# Patient Record
Sex: MALE | Race: WHITE | NOT HISPANIC OR LATINO | Employment: FULL TIME | URBAN - METROPOLITAN AREA
[De-identification: names, ages, dates, MRNs, and addresses within clinical notes are randomized per-mention and may not be internally consistent; named-entity substitution may affect disease eponyms.]

---

## 2022-11-02 ENCOUNTER — OFFICE VISIT (OUTPATIENT)
Dept: FAMILY MEDICINE CLINIC | Facility: CLINIC | Age: 22
End: 2022-11-02

## 2022-11-02 VITALS
HEART RATE: 90 BPM | BODY MASS INDEX: 42.66 KG/M2 | DIASTOLIC BLOOD PRESSURE: 84 MMHG | HEIGHT: 72 IN | SYSTOLIC BLOOD PRESSURE: 134 MMHG | OXYGEN SATURATION: 98 % | RESPIRATION RATE: 20 BRPM | WEIGHT: 315 LBS

## 2022-11-02 DIAGNOSIS — Z76.89 ENCOUNTER TO ESTABLISH CARE: Primary | ICD-10-CM

## 2022-11-02 DIAGNOSIS — E66.01 MORBID (SEVERE) OBESITY DUE TO EXCESS CALORIES: ICD-10-CM

## 2022-11-02 DIAGNOSIS — Z83.3 FAMILY HISTORY OF DIABETES MELLITUS IN GRANDMOTHER: ICD-10-CM

## 2022-11-02 DIAGNOSIS — Z13.220 LIPID SCREENING: ICD-10-CM

## 2022-11-02 PROCEDURE — 99204 OFFICE O/P NEW MOD 45 MIN: CPT | Performed by: NURSE PRACTITIONER

## 2022-11-02 RX ORDER — CARBOXYMETHYLCELLULOSE/CITRIC 0.75 G
3 CAPSULE ORAL 2 TIMES DAILY
Qty: 180 CAPSULE | Refills: 5 | Status: SHIPPED | OUTPATIENT
Start: 2022-11-02

## 2022-11-02 NOTE — PROGRESS NOTES
"Chief Complaint  Establish Care    Subjective          Juvenal Herrera presents to [facility]  History of Present Illness    Is a new patient today, is here today to establish care and discuss obesity    He is working as a    He is not in school at this time, was interested in culinary school  Does intend to return to school, does not intend to return for culinary    Review of Systems   Constitutional: Negative.  Negative for appetite change, fatigue and fever.   Respiratory: Negative.  Negative for shortness of breath.    Cardiovascular: Negative.  Negative for chest pain and palpitations.   Genitourinary: Negative.  Negative for frequency.   Musculoskeletal: Negative.    Psychiatric/Behavioral: Negative for dysphoric mood and sleep disturbance. The patient is not nervous/anxious.      Objective   Vital Signs:  Pulse 90   Resp 20   Ht 182.9 cm (72\")   Wt (!) 189 kg (416 lb)   SpO2 98%   BMI 56.42 kg/m²     BP Readings from Last 3 Encounters:   No data found for BP        Wt Readings from Last 3 Encounters:   11/02/22 (!) 189 kg (416 lb)        Class 3 Severe Obesity (BMI >=40). Obesity-related health conditions include the following: none. Obesity is unchanged. BMI is is above average; BMI management plan is completed. We discussed low calorie, low carb based diet program, portion control and increasing exercise.      Physical Exam  Constitutional:       Appearance: Normal appearance. He is obese.   Neck:      Vascular: No carotid bruit.   Cardiovascular:      Rate and Rhythm: Normal rate and regular rhythm.      Pulses: Normal pulses.      Heart sounds: Normal heart sounds.   Pulmonary:      Effort: Pulmonary effort is normal.      Breath sounds: Normal breath sounds.   Musculoskeletal:      Cervical back: Neck supple.      Right lower leg: No edema.      Left lower leg: No edema.   Skin:     General: Skin is warm.   Neurological:      Mental Status: He is " alert and oriented to person, place, and time.   Psychiatric:         Mood and Affect: Mood normal.        Result Review :                 Assessment and Plan    Diagnoses and all orders for this visit:    1. Encounter to establish care (Primary)    2. Morbid (severe) obesity due to excess calories (HCC)  -     Comprehensive metabolic panel; Future  -     Lipid panel; Future  -     Carboxymeth-Cellulose-CitricAc (Plenity Welcome Kit) capsule; Take 3 capsules by mouth 2 (Two) Times a Day. With 16 ounces of water  Dispense: 180 capsule; Refill: 5    3. Family history of diabetes mellitus in grandmother  -     Hemoglobin A1c; Future    4. Lipid screening  -     Lipid panel; Future           Follow Up   Return in about 6 months (around 5/2/2023).  Patient was given instructions and counseling regarding his condition or for health maintenance advice. Please see specific information pulled into the AVS if appropriate.

## 2023-05-03 ENCOUNTER — OFFICE VISIT (OUTPATIENT)
Dept: FAMILY MEDICINE CLINIC | Facility: CLINIC | Age: 23
End: 2023-05-03
Payer: COMMERCIAL

## 2023-05-03 ENCOUNTER — LAB (OUTPATIENT)
Dept: FAMILY MEDICINE CLINIC | Facility: CLINIC | Age: 23
End: 2023-05-03
Payer: COMMERCIAL

## 2023-05-03 VITALS
HEIGHT: 72 IN | BODY MASS INDEX: 42.66 KG/M2 | SYSTOLIC BLOOD PRESSURE: 134 MMHG | HEART RATE: 97 BPM | WEIGHT: 315 LBS | DIASTOLIC BLOOD PRESSURE: 90 MMHG | OXYGEN SATURATION: 97 % | RESPIRATION RATE: 18 BRPM

## 2023-05-03 DIAGNOSIS — E66.01 MORBID (SEVERE) OBESITY DUE TO EXCESS CALORIES: Primary | ICD-10-CM

## 2023-05-03 DIAGNOSIS — E66.01 MORBID (SEVERE) OBESITY DUE TO EXCESS CALORIES: ICD-10-CM

## 2023-05-03 DIAGNOSIS — Z83.3 FAMILY HISTORY OF DIABETES MELLITUS IN GRANDMOTHER: ICD-10-CM

## 2023-05-03 DIAGNOSIS — R03.0 ELEVATED BP WITHOUT DIAGNOSIS OF HYPERTENSION: ICD-10-CM

## 2023-05-03 DIAGNOSIS — Z13.220 LIPID SCREENING: ICD-10-CM

## 2023-05-03 LAB
ALBUMIN SERPL-MCNC: 4.1 G/DL (ref 3.5–5.2)
ALBUMIN/GLOB SERPL: 1.2 G/DL
ALP SERPL-CCNC: 124 U/L (ref 39–117)
ALT SERPL W P-5'-P-CCNC: 32 U/L (ref 1–41)
ANION GAP SERPL CALCULATED.3IONS-SCNC: 11 MMOL/L (ref 5–15)
AST SERPL-CCNC: 22 U/L (ref 1–40)
BILIRUB SERPL-MCNC: 0.4 MG/DL (ref 0–1.2)
BUN SERPL-MCNC: 16 MG/DL (ref 6–20)
BUN/CREAT SERPL: 25.8 (ref 7–25)
CALCIUM SPEC-SCNC: 9.3 MG/DL (ref 8.6–10.5)
CHLORIDE SERPL-SCNC: 101 MMOL/L (ref 98–107)
CHOLEST SERPL-MCNC: 223 MG/DL (ref 0–200)
CO2 SERPL-SCNC: 24 MMOL/L (ref 22–29)
CREAT SERPL-MCNC: 0.62 MG/DL (ref 0.76–1.27)
EGFRCR SERPLBLD CKD-EPI 2021: 137.7 ML/MIN/1.73
GLOBULIN UR ELPH-MCNC: 3.5 GM/DL
GLUCOSE SERPL-MCNC: 100 MG/DL (ref 65–99)
HBA1C MFR BLD: 5.5 % (ref 4.8–5.6)
HDLC SERPL-MCNC: 34 MG/DL (ref 40–60)
LDLC SERPL CALC-MCNC: 154 MG/DL (ref 0–100)
LDLC/HDLC SERPL: 4.44 {RATIO}
POTASSIUM SERPL-SCNC: 4.5 MMOL/L (ref 3.5–5.2)
PROT SERPL-MCNC: 7.6 G/DL (ref 6–8.5)
SODIUM SERPL-SCNC: 136 MMOL/L (ref 136–145)
TRIGL SERPL-MCNC: 190 MG/DL (ref 0–150)
VLDLC SERPL-MCNC: 35 MG/DL (ref 5–40)

## 2023-05-03 PROCEDURE — 80053 COMPREHEN METABOLIC PANEL: CPT | Performed by: NURSE PRACTITIONER

## 2023-05-03 PROCEDURE — 80061 LIPID PANEL: CPT | Performed by: NURSE PRACTITIONER

## 2023-05-03 PROCEDURE — 36415 COLL VENOUS BLD VENIPUNCTURE: CPT

## 2023-05-03 PROCEDURE — 83036 HEMOGLOBIN GLYCOSYLATED A1C: CPT | Performed by: NURSE PRACTITIONER

## 2023-05-03 NOTE — PROGRESS NOTES
Chief Complaint  Follow-up    Subjective          Juvenal Herrera presents to [facility]  History of Present Illness    Is here today for follow up    Was last seen 11/2022 to establish care and for weight loss, he was prescribed plenity at that time and encouraged to work on lifestyle changes    He did not get requested labs done, has hgb A1c, CMP, and lipid labs pending    He did try the plenity for 3 months and does not feel like he got enough results    He is calorie counting - he is trying to cap at 1500 juan/day through the week and 2000 juan/day on the weekend  Has resumed going to the gym and has a floridalma he is working out with    He is fasting for labs    He declines referral to see a nutritionist at this time  He tells me that his mother is trying to get him to take omeprazole because she thinks it can help him lose weight    He has protein shake for breakfast  Left overs for lunch  Dinner with family     He endorses that his home cooked meals are fairly healthy  He is using an wayne to help with the calorie counting    He has not tried any weight watchers or other weight loss program    Diastolic BP is elevated today, initially 143/90  Repeat BP is 134/90  Has had his Bp checked outside of here and has reading of 120/80    Review of Systems   Constitutional: Negative.  Negative for appetite change and fatigue.   Respiratory: Negative.  Negative for shortness of breath.    Cardiovascular: Negative for chest pain.   Gastrointestinal: Negative.  Negative for abdominal pain and constipation.   Musculoskeletal: Negative.  Negative for arthralgias and myalgias.   Neurological: Negative.  Negative for dizziness and headaches.   Psychiatric/Behavioral: Negative for sleep disturbance.        Endorses that he sleeps well, averages 6-8 hours/night  Denies any obvious snoring, wakes feeling rested, denies am headaches     Objective   Vital Signs:  /90 (BP Location: Left arm,  "Patient Position: Sitting)   Pulse 97   Resp 18   Ht 182.9 cm (72.01\")   Wt (!) 187 kg (413 lb)   SpO2 97%   BMI 56.00 kg/m²     BP Readings from Last 3 Encounters:   05/03/23 134/90   11/23/22 152/90   11/02/22 134/84        Wt Readings from Last 3 Encounters:   05/03/23 (!) 187 kg (413 lb)   11/23/22 (!) 190 kg (419 lb)   11/02/22 (!) 189 kg (416 lb)        Class 3 Severe Obesity (BMI >=40). Obesity-related health conditions include the following: none. Obesity is improving with treatment. BMI is is above average; BMI management plan is completed. We discussed low calorie, low carb based diet program, portion control and increasing exercise.      Physical Exam  Vitals reviewed.   Constitutional:       Appearance: Normal appearance. He is obese.   Neck:      Vascular: No carotid bruit.   Cardiovascular:      Rate and Rhythm: Normal rate and regular rhythm.      Pulses: Normal pulses.      Heart sounds: Normal heart sounds.   Pulmonary:      Effort: Pulmonary effort is normal.      Breath sounds: Normal breath sounds.   Musculoskeletal:      Cervical back: Neck supple.      Right lower leg: No edema.      Left lower leg: No edema.   Skin:     General: Skin is warm.   Neurological:      Mental Status: He is alert and oriented to person, place, and time.        Result Review :                     Assessment and Plan    Diagnoses and all orders for this visit:    1. Morbid (severe) obesity due to excess calories (Primary)    2. Elevated BP without diagnosis of hypertension    check labs, monitor BP, close follow up       Follow Up   Return in about 4 weeks (around 5/31/2023) for Recheck weight, BP.  Patient was given instructions and counseling regarding his condition or for health maintenance advice. Please see specific information pulled into the AVS if appropriate.       " Yes...

## 2023-05-31 ENCOUNTER — OFFICE VISIT (OUTPATIENT)
Dept: FAMILY MEDICINE CLINIC | Facility: CLINIC | Age: 23
End: 2023-05-31

## 2023-05-31 VITALS
DIASTOLIC BLOOD PRESSURE: 88 MMHG | HEART RATE: 91 BPM | WEIGHT: 315 LBS | HEIGHT: 72 IN | SYSTOLIC BLOOD PRESSURE: 130 MMHG | OXYGEN SATURATION: 97 % | BODY MASS INDEX: 42.66 KG/M2 | RESPIRATION RATE: 18 BRPM

## 2023-05-31 DIAGNOSIS — R03.0 ELEVATED BP WITHOUT DIAGNOSIS OF HYPERTENSION: Primary | ICD-10-CM

## 2023-05-31 PROCEDURE — 99212 OFFICE O/P EST SF 10 MIN: CPT | Performed by: NURSE PRACTITIONER

## 2023-05-31 NOTE — PROGRESS NOTES
"Chief Complaint  Follow-up    Subjective          Juvenal Herrera presents to [facility]  History of Present Illness      Is here today for follow up on his elevated BP without h/o HTN  He has been monitoring his BP at home  Tells me that it has been running 120-130s/80-90s with t he 90s being on the lower end  It is more often in the 80s than into the 90s  He is exercising at least 3 days/week    He is trying to stick to a 3987-5400 juan/day through the week, with more laxity on the weekend    He is also watching salt intake, is drinking flavored in place of soda    Review of Systems   Constitutional: Negative.  Negative for appetite change, fatigue and fever.   Respiratory: Negative.  Negative for shortness of breath.    Cardiovascular: Negative.  Negative for chest pain.   Neurological: Negative.  Negative for dizziness and headaches.   Psychiatric/Behavioral: Negative for dysphoric mood and sleep disturbance. The patient is not nervous/anxious.      Objective   Vital Signs:  /88 (BP Location: Left arm, Patient Position: Sitting)   Pulse 91   Resp 18   Ht 182.9 cm (72.01\")   Wt (!) 189 kg (417 lb)   SpO2 97%   BMI 56.54 kg/m²     BP Readings from Last 3 Encounters:   05/31/23 130/88   05/03/23 134/90   11/23/22 152/90        Wt Readings from Last 3 Encounters:   05/31/23 (!) 189 kg (417 lb)   05/03/23 (!) 187 kg (413 lb)   11/23/22 (!) 190 kg (419 lb)        Class 3 Severe Obesity (BMI >=40). Obesity-related health conditions include the following: dyslipidemias. Obesity is unchanged. BMI is is above average; BMI management plan is completed. We discussed low calorie, low carb based diet program, portion control and increasing exercise.      Physical Exam  Vitals reviewed.   Constitutional:       Appearance: Normal appearance. He is obese.   Neck:      Vascular: No carotid bruit.   Cardiovascular:      Rate and Rhythm: Normal rate and regular rhythm.      Heart " sounds: Normal heart sounds.   Pulmonary:      Effort: Pulmonary effort is normal.      Breath sounds: Normal breath sounds.   Musculoskeletal:      Right lower leg: No edema.      Left lower leg: No edema.   Skin:     General: Skin is warm.   Neurological:      Mental Status: He is alert and oriented to person, place, and time.        Result Review :                 Assessment and Plan    Diagnoses and all orders for this visit:    1. Elevated BP without diagnosis of hypertension (Primary)    continue to monitor BP at home, continue regular exercise and diet changes, close follow up for weight and BP        Follow Up   Return in about 3 months (around 8/31/2023) for Recheck BP, weight.  Patient was given instructions and counseling regarding his condition or for health maintenance advice. Please see specific information pulled into the AVS if appropriate.

## 2023-10-05 ENCOUNTER — OFFICE VISIT (OUTPATIENT)
Dept: FAMILY MEDICINE CLINIC | Facility: CLINIC | Age: 23
End: 2023-10-05
Payer: COMMERCIAL

## 2023-10-05 VITALS
SYSTOLIC BLOOD PRESSURE: 132 MMHG | RESPIRATION RATE: 20 BRPM | DIASTOLIC BLOOD PRESSURE: 84 MMHG | OXYGEN SATURATION: 98 % | BODY MASS INDEX: 42.66 KG/M2 | WEIGHT: 315 LBS | HEIGHT: 72 IN | HEART RATE: 89 BPM

## 2023-10-05 DIAGNOSIS — R03.0 ELEVATED BP WITHOUT DIAGNOSIS OF HYPERTENSION: Primary | ICD-10-CM

## 2023-10-05 DIAGNOSIS — E66.01 MORBID (SEVERE) OBESITY DUE TO EXCESS CALORIES: ICD-10-CM

## 2023-10-05 PROBLEM — H53.021 REFRACTIVE AMBLYOPIA OF RIGHT EYE: Status: ACTIVE | Noted: 2021-12-03

## 2023-10-05 PROBLEM — H52.13 MYOPIA OF BOTH EYES: Status: ACTIVE | Noted: 2019-10-28

## 2023-10-05 NOTE — PROGRESS NOTES
Chief Complaint  Annual Exam    Subjective          Juvenal Herrera presents to [facility]  History of Present Illness    Is here today for follow up elevated blood pressure, weight, physical exam    Has h/o obesity, working on weight loss  Was seen in May for concerns about his blood pressure being elevated     At last visit we discussed a 4180-3649 juan/day diet which he already trying to do  He had changed his soda over to flavored water and was watching his salt intake  Encouraged increased physical activity    He tells me today that he has started weight watchers in August    He has not been doing much by way of physical activity, encouraged to work on increasing his physical activity    He has a goal of weighing 250 pounds  He thinks that the last time he weighed this was in high school    He has considered weight loss surgery but this is not something that is affordable to him at this time    We discussed his cholesterol lab and encouraged a low fat, high fiber diet, fish, lean proteins, whole grains, etc. And increasing the physical activity    He has been monitoring his blood pressure at home and he tells me that he has been getting good numbers, not elevated    Review of Systems   Constitutional:  Negative for appetite change, fatigue and fever.   Respiratory: Negative.  Negative for shortness of breath.    Cardiovascular: Negative.  Negative for chest pain.   Gastrointestinal: Negative.  Negative for abdominal pain, constipation and diarrhea.   Genitourinary: Negative.  Negative for frequency and urgency.   Musculoskeletal: Negative.  Negative for arthralgias and myalgias.   Allergic/Immunologic: Positive for environmental allergies.        He endorses occasional seasonal allergy symptoms   Neurological:  Negative for dizziness, weakness and headaches.   Psychiatric/Behavioral: Negative.  Negative for dysphoric mood and sleep disturbance. The patient is not  "nervous/anxious.        Objective   Vital Signs:  /84 (BP Location: Left arm, Patient Position: Sitting, Cuff Size: Large Adult)   Pulse 89   Resp 20   Ht 182.9 cm (72.01\")   Wt (!) 189 kg (416 lb 12.8 oz)   SpO2 98%   BMI 56.51 kg/m²     BP Readings from Last 3 Encounters:   10/05/23 132/84   05/31/23 130/88   05/03/23 134/90        Wt Readings from Last 3 Encounters:   10/05/23 (!) 189 kg (416 lb 12.8 oz)   05/31/23 (!) 189 kg (417 lb)   05/03/23 (!) 187 kg (413 lb)        Physical Exam  Vitals reviewed.   Constitutional:       Appearance: Normal appearance. He is obese.   HENT:      Right Ear: Tympanic membrane, ear canal and external ear normal.      Left Ear: Tympanic membrane, ear canal and external ear normal.      Nose: Nose normal.      Mouth/Throat:      Mouth: Mucous membranes are moist.      Pharynx: Oropharynx is clear.   Eyes:      Extraocular Movements: Extraocular movements intact.   Neck:      Thyroid: No thyromegaly.      Vascular: No carotid bruit.   Cardiovascular:      Rate and Rhythm: Normal rate and regular rhythm.      Pulses: Normal pulses.      Heart sounds: Normal heart sounds.   Pulmonary:      Effort: Pulmonary effort is normal.      Breath sounds: Normal breath sounds.   Abdominal:      General: Bowel sounds are normal.      Palpations: Abdomen is soft.      Tenderness: There is no abdominal tenderness. There is no right CVA tenderness or left CVA tenderness.   Musculoskeletal:         General: Normal range of motion.      Cervical back: Neck supple. No tenderness.      Right lower leg: No edema.      Left lower leg: No edema.   Lymphadenopathy:      Cervical: No cervical adenopathy.   Skin:     General: Skin is warm.   Neurological:      Mental Status: He is alert and oriented to person, place, and time.   Psychiatric:         Mood and Affect: Mood normal.      Result Review :     CMP          5/3/2023    10:00   CMP   Glucose 100    BUN 16    Creatinine 0.62    EGFR 137.7  "   Sodium 136    Potassium 4.5    Chloride 101    Calcium 9.3    Total Protein 7.6    Albumin 4.1    Globulin 3.5    Total Bilirubin 0.4    Alkaline Phosphatase 124    AST (SGOT) 22    ALT (SGPT) 32    Albumin/Globulin Ratio 1.2    BUN/Creatinine Ratio 25.8    Anion Gap 11.0      Lipid Panel          5/3/2023    10:00   Lipid Panel   Total Cholesterol 223    Triglycerides 190    HDL Cholesterol 34    VLDL Cholesterol 35    LDL Cholesterol  154    LDL/HDL Ratio 4.44      Most Recent A1C          5/3/2023    10:00   HGBA1C Most Recent   Hemoglobin A1C 5.50                Assessment and Plan    Diagnoses and all orders for this visit:    1. Elevated BP without diagnosis of hypertension (Primary)    2. Morbid (severe) obesity due to excess calories    Continue with weight loss efforts, increase physical activity, keep cholesterol in mind when making diet choices  Will plan to recheck his cholesterol at next visit       Follow Up   Return in about 3 months (around 1/5/2024), or as needed, for Recheck weight.  Patient was given instructions and counseling regarding his condition or for health maintenance advice. Please see specific information pulled into the AVS if appropriate.

## 2024-01-05 ENCOUNTER — OFFICE VISIT (OUTPATIENT)
Dept: FAMILY MEDICINE CLINIC | Facility: CLINIC | Age: 24
End: 2024-01-05
Payer: COMMERCIAL

## 2024-01-05 VITALS
BODY MASS INDEX: 42.66 KG/M2 | HEIGHT: 72 IN | SYSTOLIC BLOOD PRESSURE: 130 MMHG | HEART RATE: 88 BPM | DIASTOLIC BLOOD PRESSURE: 88 MMHG | WEIGHT: 315 LBS | OXYGEN SATURATION: 99 % | RESPIRATION RATE: 20 BRPM

## 2024-01-05 DIAGNOSIS — E66.01 MORBID (SEVERE) OBESITY DUE TO EXCESS CALORIES: Primary | ICD-10-CM

## 2024-01-05 NOTE — PROGRESS NOTES
"Chief Complaint  Follow-up (Weight and bp)    Subjective          Juvenal Herrera presents to [facility]  History of Present Illness    Is here today for follow up weight and blood pressure    He is active at work, does move and stack wood at home, he is not exercising regularly otherwise    He endorses that his meals tend to be healthy, mom has h/o obesity and has had bariatric surgery    He is interested in seeking information for himself about a possible bariatric surgery    Review of Systems   Constitutional: Negative.  Negative for appetite change, fatigue and fever.   Respiratory: Negative.  Negative for shortness of breath.    Cardiovascular: Negative.  Negative for chest pain.   Neurological: Negative.  Negative for dizziness, weakness and headaches.   Psychiatric/Behavioral: Negative.  Negative for dysphoric mood and sleep disturbance. The patient is not nervous/anxious.      Objective   Vital Signs:  /88   Pulse 88   Resp 20   Ht 182.9 cm (72.01\")   Wt (!) 190 kg (419 lb)   SpO2 99%   BMI 56.81 kg/m²     BP Readings from Last 3 Encounters:   01/05/24 130/88   10/05/23 132/84   05/31/23 130/88        Wt Readings from Last 3 Encounters:   01/05/24 (!) 190 kg (419 lb)   10/05/23 (!) 189 kg (416 lb 12.8 oz)   05/31/23 (!) 189 kg (417 lb)      Physical Exam  Vitals reviewed.   Constitutional:       Appearance: Normal appearance. He is obese.   Neck:      Vascular: No carotid bruit.   Cardiovascular:      Rate and Rhythm: Normal rate and regular rhythm.      Heart sounds: Normal heart sounds.   Pulmonary:      Effort: Pulmonary effort is normal.      Breath sounds: Normal breath sounds.   Musculoskeletal:      Cervical back: Neck supple.      Right lower leg: No edema.      Left lower leg: No edema.   Skin:     General: Skin is warm.   Neurological:      Mental Status: He is alert and oriented to person, place, and time.   Psychiatric:         Mood and Affect: Mood " normal.         Behavior: Behavior normal.        Result Review :     CMP          5/3/2023    10:00   CMP   Glucose 100    BUN 16    Creatinine 0.62    EGFR 137.7    Sodium 136    Potassium 4.5    Chloride 101    Calcium 9.3    Total Protein 7.6    Albumin 4.1    Globulin 3.5    Total Bilirubin 0.4    Alkaline Phosphatase 124    AST (SGOT) 22    ALT (SGPT) 32    Albumin/Globulin Ratio 1.2    BUN/Creatinine Ratio 25.8    Anion Gap 11.0      Lipid Panel          5/3/2023    10:00   Lipid Panel   Total Cholesterol 223    Triglycerides 190    HDL Cholesterol 34    VLDL Cholesterol 35    LDL Cholesterol  154    LDL/HDL Ratio 4.44      Most Recent A1C          5/3/2023    10:00   HGBA1C Most Recent   Hemoglobin A1C 5.50                Assessment and Plan    Diagnoses and all orders for this visit:    1. Morbid (severe) obesity due to excess calories (Primary)  -     Ambulatory Referral to Bariatric Surgery           Follow Up   Return in about 9 months (around 10/5/2024) for Annual physical.  Patient was given instructions and counseling regarding his condition or for health maintenance advice. Please see specific information pulled into the AVS if appropriate.

## 2024-11-27 ENCOUNTER — OFFICE VISIT (OUTPATIENT)
Dept: FAMILY MEDICINE CLINIC | Facility: CLINIC | Age: 24
End: 2024-11-27
Payer: COMMERCIAL

## 2024-11-27 VITALS
RESPIRATION RATE: 18 BRPM | SYSTOLIC BLOOD PRESSURE: 132 MMHG | BODY MASS INDEX: 42.66 KG/M2 | HEIGHT: 72 IN | OXYGEN SATURATION: 98 % | HEART RATE: 102 BPM | DIASTOLIC BLOOD PRESSURE: 88 MMHG | WEIGHT: 315 LBS

## 2024-11-27 DIAGNOSIS — E66.01 MORBID (SEVERE) OBESITY DUE TO EXCESS CALORIES: Primary | ICD-10-CM

## 2024-11-27 NOTE — PROGRESS NOTES
"Chief Complaint  Referral (Referral to Bariatrics)    Subjective          Juvenal Herrera presents to [facility]  History of Present Illness    Is here today to request a referral to bariatrics    He has had a referral allen the past but lost his insurance in the process and did not get to see them  He is now covered by health insurance again    His mother has h/o obesity and bariatric surgery that has been successful for her    He endorses that he has been off the wagon with diet and exercise but is working towards getting back in the routine    We discussed the importance of diet and lifestyle for maintenance of weight loss once achieved    He denies any new problems or concerns    Review of Systems   Constitutional: Negative.    Respiratory: Negative.     Cardiovascular: Negative.    Gastrointestinal: Negative.    Neurological: Negative.      Objective   Vital Signs:  /88 (BP Location: Right arm, Patient Position: Sitting)   Pulse 102   Resp 18   Ht 182.9 cm (72.01\")   Wt (!) 194 kg (427 lb)   SpO2 98%   BMI 57.90 kg/m²     BP Readings from Last 3 Encounters:   11/27/24 132/88   01/05/24 130/88   10/05/23 132/84        Wt Readings from Last 3 Encounters:   11/27/24 (!) 194 kg (427 lb)   01/05/24 (!) 190 kg (419 lb)   10/05/23 (!) 189 kg (416 lb 12.8 oz)       Physical Exam  Vitals reviewed.   Constitutional:       Appearance: Normal appearance. He is obese.   Neck:      Vascular: No carotid bruit.   Cardiovascular:      Rate and Rhythm: Normal rate and regular rhythm.      Pulses: Normal pulses.      Heart sounds: Normal heart sounds.   Pulmonary:      Effort: Pulmonary effort is normal.      Breath sounds: Normal breath sounds.   Musculoskeletal:      Right lower leg: No edema.      Left lower leg: No edema.   Skin:     General: Skin is warm.   Neurological:      Mental Status: He is alert and oriented to person, place, and time.   Psychiatric:         Mood and " Affect: Mood normal.         Behavior: Behavior normal.        Result Review :                 Assessment and Plan    Diagnoses and all orders for this visit:    1. Morbid (severe) obesity due to excess calories (Primary)  -     Ambulatory Referral to Bariatric Surgery           Follow Up   Return in about 3 months (around 2/27/2025), or as needed, for Annual physical.  Patient was given instructions and counseling regarding his condition or for health maintenance advice. Please see specific information pulled into the AVS if appropriate.

## 2025-02-17 ENCOUNTER — TELEPHONE (OUTPATIENT)
Dept: BARIATRICS/WEIGHT MGMT | Facility: CLINIC | Age: 25
End: 2025-02-17
Payer: COMMERCIAL

## 2025-02-17 NOTE — TELEPHONE ENCOUNTER
Called Juvenal Herrera    to discuss bariatric new patient packet   0 Months of SWL require by insurance       Current Health Plan(s)   Yes Cert of Coverage Obtained    VIA  pt    Primary/ ID #  Kareem   [id number]            Secondary/ ID #             Exclusion on Policy     Exclusion   No Pt wanting selfpay No     Deductible Individual                  Amount Met  3000.00                                       0 OOP Max Individual           Amount Met       Deductible Family                       Amount Met  6000.00                                       70.52 OOP Max Family               Amount Met  10,000.00                          70.52     Bariatric Lifetime Max 0      Facility or Accreditation required? None    Insurance Coverage %       Called Patient [phone]    Intake Scheduled for 3.4.25       Arrive at 745AM, bring drink, snack, and jacket, classroom is cold.  All pt will have class, see MÓNICA ANTONIO and [facility]      Cert of Coverage attached Yes     Does pt have a None  if yes Who   Need to be at appts? no    Told pt to [facility], area code 859 about 2 weeks prior to intake.   Paperwork needs to be completed before intake appt, or may cause delay in eval. Yes    Additional Notes:

## 2025-02-27 ENCOUNTER — LAB (OUTPATIENT)
Dept: FAMILY MEDICINE CLINIC | Facility: CLINIC | Age: 25
End: 2025-02-27
Payer: COMMERCIAL

## 2025-02-27 ENCOUNTER — OFFICE VISIT (OUTPATIENT)
Dept: FAMILY MEDICINE CLINIC | Facility: CLINIC | Age: 25
End: 2025-02-27
Payer: COMMERCIAL

## 2025-02-27 VITALS
OXYGEN SATURATION: 96 % | HEIGHT: 72 IN | SYSTOLIC BLOOD PRESSURE: 134 MMHG | BODY MASS INDEX: 42.66 KG/M2 | HEART RATE: 97 BPM | RESPIRATION RATE: 18 BRPM | DIASTOLIC BLOOD PRESSURE: 82 MMHG | WEIGHT: 315 LBS

## 2025-02-27 DIAGNOSIS — Z00.00 ANNUAL PHYSICAL EXAM: ICD-10-CM

## 2025-02-27 DIAGNOSIS — E55.9 VITAMIN D DEFICIENCY: ICD-10-CM

## 2025-02-27 DIAGNOSIS — Z00.00 ANNUAL PHYSICAL EXAM: Primary | ICD-10-CM

## 2025-02-27 DIAGNOSIS — D72.829 LEUKOCYTOSIS, UNSPECIFIED TYPE: ICD-10-CM

## 2025-02-27 LAB — TSH SERPL DL<=0.05 MIU/L-ACNC: 1.43 UIU/ML (ref 0.27–4.2)

## 2025-02-27 PROCEDURE — 80050 GENERAL HEALTH PANEL: CPT | Performed by: NURSE PRACTITIONER

## 2025-02-27 PROCEDURE — 82306 VITAMIN D 25 HYDROXY: CPT | Performed by: NURSE PRACTITIONER

## 2025-02-27 PROCEDURE — 83036 HEMOGLOBIN GLYCOSYLATED A1C: CPT | Performed by: NURSE PRACTITIONER

## 2025-02-27 PROCEDURE — 99395 PREV VISIT EST AGE 18-39: CPT | Performed by: NURSE PRACTITIONER

## 2025-02-27 PROCEDURE — 36415 COLL VENOUS BLD VENIPUNCTURE: CPT

## 2025-02-27 PROCEDURE — 80061 LIPID PANEL: CPT | Performed by: NURSE PRACTITIONER

## 2025-02-27 NOTE — PROGRESS NOTES
"Chief Complaint  Annual Exam    Subjective          Juvenal Herrera presents to [facility]  History of Present Illness    Is here today for annual physical    No significant health history or routine medications, is obese    Was referred to bariatrics in  November 2024, has an upcoming office visit next week    Diet is reported to be fair, understands what changes could be made    Is staying active with wood chopping and stacking    Is not fasting for lab, had nuts, diet soda, and a cold-cut sandwich for lunch    Review of Systems   Constitutional: Negative.  Negative for activity change, appetite change and fatigue.   Respiratory: Negative.  Negative for shortness of breath.    Cardiovascular: Negative.  Negative for chest pain and palpitations.   Gastrointestinal: Negative.    Genitourinary: Negative.    Musculoskeletal: Negative.    Allergic/Immunologic: Positive for environmental allergies.        Endorses mild seasonal allergies   Neurological: Negative.  Negative for dizziness, weakness and headaches.   Psychiatric/Behavioral: Negative.  Negative for dysphoric mood and sleep disturbance. The patient is not nervous/anxious.         Sleeps fair, is not getting enough sleep, averages 5-7 hours/night     Objective   Vital Signs:  /82 (BP Location: Right arm, Patient Position: Sitting, Cuff Size: Large Adult)   Pulse 97   Resp 18   Ht 182.9 cm (72.01\")   Wt (!) 195 kg (430 lb 3.2 oz)   SpO2 96%   BMI 58.33 kg/m²     BP Readings from Last 3 Encounters:   02/27/25 134/82   11/27/24 132/88   01/05/24 130/88        Wt Readings from Last 3 Encounters:   02/27/25 (!) 195 kg (430 lb 3.2 oz)   11/27/24 (!) 194 kg (427 lb)   01/05/24 (!) 190 kg (419 lb)      Physical Exam  Vitals reviewed.   Constitutional:       Appearance: Normal appearance. He is obese.   HENT:      Right Ear: Tympanic membrane, ear canal and external ear normal.      Left Ear: Tympanic membrane, ear canal " and external ear normal.      Mouth/Throat:      Mouth: Mucous membranes are moist.      Pharynx: Oropharynx is clear.   Neck:      Thyroid: No thyromegaly.      Vascular: No carotid bruit.   Cardiovascular:      Rate and Rhythm: Normal rate and regular rhythm.      Pulses: Normal pulses.      Heart sounds: Normal heart sounds.   Pulmonary:      Effort: Pulmonary effort is normal.      Breath sounds: Normal breath sounds.   Abdominal:      General: Bowel sounds are normal.      Palpations: Abdomen is soft.   Musculoskeletal:      Cervical back: Neck supple. No tenderness.      Right lower leg: No edema.      Left lower leg: No edema.   Lymphadenopathy:      Cervical: No cervical adenopathy.   Skin:     General: Skin is warm.   Neurological:      Mental Status: He is alert and oriented to person, place, and time.   Psychiatric:         Mood and Affect: Mood normal.         Behavior: Behavior normal.        Result Review :                 Assessment and Plan    Diagnoses and all orders for this visit:    1. Annual physical exam (Primary)  -     Comprehensive metabolic panel; Future  -     Hemoglobin A1c; Future  -     CBC w AUTO Differential; Future  -     Lipid panel; Future  -     TSH Rfx On Abnormal To Free T4; Future  -     Vitamin D 25 hydroxy; Future           Follow Up   Return in about 1 year (around 2/27/2026), or as needed, for Annual physical.  Patient was given instructions and counseling regarding his condition or for health maintenance advice. Please see specific information pulled into the AVS if appropriate.

## 2025-02-28 ENCOUNTER — TELEPHONE (OUTPATIENT)
Dept: FAMILY MEDICINE CLINIC | Facility: CLINIC | Age: 25
End: 2025-02-28
Payer: COMMERCIAL

## 2025-02-28 PROBLEM — E55.9 VITAMIN D DEFICIENCY: Status: ACTIVE | Noted: 2025-02-28

## 2025-02-28 LAB
25(OH)D3 SERPL-MCNC: 14.7 NG/ML (ref 30–100)
ALBUMIN SERPL-MCNC: 3.9 G/DL (ref 3.5–5.2)
ALBUMIN/GLOB SERPL: 1.1 G/DL
ALP SERPL-CCNC: 131 U/L (ref 39–117)
ALT SERPL W P-5'-P-CCNC: 46 U/L (ref 1–41)
ANION GAP SERPL CALCULATED.3IONS-SCNC: 10.1 MMOL/L (ref 5–15)
AST SERPL-CCNC: 27 U/L (ref 1–40)
BASOPHILS # BLD AUTO: 0.06 10*3/MM3 (ref 0–0.2)
BASOPHILS NFR BLD AUTO: 0.5 % (ref 0–1.5)
BILIRUB SERPL-MCNC: 0.3 MG/DL (ref 0–1.2)
BUN SERPL-MCNC: 16 MG/DL (ref 6–20)
BUN/CREAT SERPL: 20.8 (ref 7–25)
CALCIUM SPEC-SCNC: 9.6 MG/DL (ref 8.6–10.5)
CHLORIDE SERPL-SCNC: 103 MMOL/L (ref 98–107)
CHOLEST SERPL-MCNC: 207 MG/DL (ref 0–200)
CO2 SERPL-SCNC: 24.9 MMOL/L (ref 22–29)
CREAT SERPL-MCNC: 0.77 MG/DL (ref 0.76–1.27)
DEPRECATED RDW RBC AUTO: 42.5 FL (ref 37–54)
EGFRCR SERPLBLD CKD-EPI 2021: 128.2 ML/MIN/1.73
EOSINOPHIL # BLD AUTO: 0.08 10*3/MM3 (ref 0–0.4)
EOSINOPHIL NFR BLD AUTO: 0.7 % (ref 0.3–6.2)
ERYTHROCYTE [DISTWIDTH] IN BLOOD BY AUTOMATED COUNT: 13.5 % (ref 12.3–15.4)
GLOBULIN UR ELPH-MCNC: 3.7 GM/DL
GLUCOSE SERPL-MCNC: 100 MG/DL (ref 65–99)
HBA1C MFR BLD: 5.9 % (ref 4.8–5.6)
HCT VFR BLD AUTO: 43.6 % (ref 37.5–51)
HDLC SERPL-MCNC: 33 MG/DL (ref 40–60)
HGB BLD-MCNC: 14.6 G/DL (ref 13–17.7)
IMM GRANULOCYTES # BLD AUTO: 0.04 10*3/MM3 (ref 0–0.05)
IMM GRANULOCYTES NFR BLD AUTO: 0.3 % (ref 0–0.5)
LDLC SERPL CALC-MCNC: 144 MG/DL (ref 0–100)
LDLC/HDLC SERPL: 4.28 {RATIO}
LYMPHOCYTES # BLD AUTO: 2.64 10*3/MM3 (ref 0.7–3.1)
LYMPHOCYTES NFR BLD AUTO: 21.6 % (ref 19.6–45.3)
MCH RBC QN AUTO: 28.9 PG (ref 26.6–33)
MCHC RBC AUTO-ENTMCNC: 33.5 G/DL (ref 31.5–35.7)
MCV RBC AUTO: 86.2 FL (ref 79–97)
MONOCYTES # BLD AUTO: 0.9 10*3/MM3 (ref 0.1–0.9)
MONOCYTES NFR BLD AUTO: 7.4 % (ref 5–12)
NEUTROPHILS NFR BLD AUTO: 69.5 % (ref 42.7–76)
NEUTROPHILS NFR BLD AUTO: 8.5 10*3/MM3 (ref 1.7–7)
NRBC BLD AUTO-RTO: 0 /100 WBC (ref 0–0.2)
PLATELET # BLD AUTO: 369 10*3/MM3 (ref 140–450)
PMV BLD AUTO: 9.6 FL (ref 6–12)
POTASSIUM SERPL-SCNC: 4 MMOL/L (ref 3.5–5.2)
PROT SERPL-MCNC: 7.6 G/DL (ref 6–8.5)
RBC # BLD AUTO: 5.06 10*6/MM3 (ref 4.14–5.8)
SODIUM SERPL-SCNC: 138 MMOL/L (ref 136–145)
TRIGL SERPL-MCNC: 164 MG/DL (ref 0–150)
VLDLC SERPL-MCNC: 30 MG/DL (ref 5–40)
WBC NRBC COR # BLD AUTO: 12.22 10*3/MM3 (ref 3.4–10.8)

## 2025-02-28 RX ORDER — ERGOCALCIFEROL 1.25 MG/1
50000 CAPSULE, LIQUID FILLED ORAL WEEKLY
Qty: 12 CAPSULE | Refills: 0 | Status: SHIPPED | OUTPATIENT
Start: 2025-02-28

## 2025-02-28 NOTE — TELEPHONE ENCOUNTER
HUB to share.  to return call. Your labs are as follows:  Hemoglobin A1C is 5.9, up from 5.5 last year.  5.9 is in the prediabetes range, this will most likely improve with diet changes, exercise and weight loss.     On the metabolic panel, your blood sugar was a little elevated at 100, and liver enzymes are mildly elevated as well - again, working to improve diet, along with exercise and weight loss should be helpful here, we will keep an eye on these.  Your metabolic panel otherwise, electrolytes, proteins, and kidney functions are all normal.     The blood counts are overall fine, your white blood cell count is a little elevated which happens with illness - if you have had a recent illness or are coming down with something that would explain it, will recheck this as well, would not do any sooner than 2-4 weeks at your convenience.       Your thyroid lab is normal.     Your vitamin D is low at 14.7, normal is .  I have sent a prescription for a once weekly vitamin D supplement, you will take it for 12 weeks.  Once finished, I recommend that you continue with a daily over the counter dose of vitamin D3 50 mcg or 2000 IU with vitamin K2.  Getting your vitamin D level up is beneficial to your immune system, and will usually improve feelings of tiredness or fatigue.     Your lipids are improved from last check, certainly room for improvement, but not terrible especially since you were not fasting -  Total cholesterol is 207, goal is less than 200,  Triglycerides are 164, goal is less than 150,  HDL is 33, goal is greater than 40 (increase fiber in the diet and physical activity), and  LDL is 144, goal is less than 100.  As with the blood sugar, A1C, and liver enzymes, these values will likely get to goals with lifestyle modification as discussed.    ----- Message from Mayra ROD sent at 2/28/2025  8:08 AM EST -----      ----- Message -----  From: Jessi Rivera APRN  Sent: 2/28/2025   7:17 AM EST  To:  Mayra Krishna MA    Please call with result note if not seen in [facility], thank you.    Your labs are as follows:  Hemoglobin A1C is 5.9, up from 5.5 last year.  5.9 is in the prediabetes range, this will most likely improve with diet changes, exercise and weight loss.    On the metabolic panel, your blood sugar was a little elevated at 100, and liver enzymes are mildly elevated as well - again, working to improve diet, along with exercise and weight loss should be helpful here, we will keep an eye on these.  Your metabolic panel otherwise, electrolytes, proteins, and kidney functions are all normal.    The blood counts are overall fine, your white blood cell count is a little elevated which happens with illness - if you have had a recent illness or are coming down with something that would explain it, will recheck this as well, would not do any sooner than 2-4 weeks at your convenience.      Your thyroid lab is normal.    Your vitamin D is low at 14.7, normal is .  I have sent a prescription for a once weekly vitamin D supplement, you will take it for 12 weeks.  Once finished, I recommend that you continue with a daily over the counter dose of vitamin D3 50 mcg or 2000 IU with vitamin K2.  Getting your vitamin D level up is beneficial to your immune system, and will usually improve feelings of tiredness or fatigue.    Your lipids are improved from last check, certainly room for improvement, but not terrible especially since you were not fasting -   Total cholesterol is 207, goal is less than 200,  Triglycerides are 164, goal is less than 150,  HDL is 33, goal is greater than 40 (increase fiber in the diet and physical activity), and  LDL is 144, goal is less than 100.  As with the blood sugar, A1C, and liver enzymes, these values will likely get to goals with lifestyle modification as discussed.

## 2025-03-04 ENCOUNTER — PREP FOR SURGERY (OUTPATIENT)
Dept: OTHER | Facility: HOSPITAL | Age: 25
End: 2025-03-04
Payer: COMMERCIAL

## 2025-03-04 ENCOUNTER — CONSULT (OUTPATIENT)
Dept: BARIATRICS/WEIGHT MGMT | Facility: CLINIC | Age: 25
End: 2025-03-04
Payer: COMMERCIAL

## 2025-03-04 ENCOUNTER — PATIENT ROUNDING (BHMG ONLY) (OUTPATIENT)
Dept: BARIATRICS/WEIGHT MGMT | Facility: CLINIC | Age: 25
End: 2025-03-04
Payer: COMMERCIAL

## 2025-03-04 VITALS
BODY MASS INDEX: 44.1 KG/M2 | DIASTOLIC BLOOD PRESSURE: 88 MMHG | RESPIRATION RATE: 18 BRPM | SYSTOLIC BLOOD PRESSURE: 131 MMHG | HEIGHT: 71 IN | WEIGHT: 315 LBS | OXYGEN SATURATION: 99 % | HEART RATE: 88 BPM

## 2025-03-04 DIAGNOSIS — E66.813 CLASS 3 OBESITY: Primary | ICD-10-CM

## 2025-03-04 DIAGNOSIS — Z01.818 PRE-OP EXAM: ICD-10-CM

## 2025-03-04 RX ORDER — DIPHENOXYLATE HYDROCHLORIDE AND ATROPINE SULFATE 2.5; .025 MG/1; MG/1
TABLET ORAL
COMMUNITY

## 2025-03-04 RX ORDER — SODIUM CHLORIDE 9 MG/ML
30 INJECTION, SOLUTION INTRAVENOUS CONTINUOUS PRN
OUTPATIENT
Start: 2025-03-04 | End: 2025-03-04

## 2025-03-04 RX ORDER — SODIUM CHLORIDE 0.9 % (FLUSH) 0.9 %
10 SYRINGE (ML) INJECTION AS NEEDED
OUTPATIENT
Start: 2025-03-04

## 2025-03-04 NOTE — H&P (VIEW-ONLY)
"Juvenal Herrera is a 24 y.o. male with morbid obesity with co-morbidities including none    [facility] BARIATRIC SURGERY  [address] [place] [address]  [address] [place] [address]  Dept: [phone]  3/4/2025      Juvenal Herrera.  [id number]  [id number]  2000  male      Chief Complaint of weight gain; unable to maintain weight loss    History of Present Illness:   Juvenal is a 24 y.o. male who presents today for evaluation, education and consultation regarding weight loss surgery. The patient is interested in the sleeve gastrectomy.      Diet History:See dietician/RN/MA documentation for complete history of weight and diet.     Bariatric Surgery Evaluation: The patient is being seen for an initial visit for bariatric surgery evaluation.     ;STOP-Bang Score  Have you been diagnosed with Sleep Apnea?: no  Snoring?: no  Tired?: yes  Observed?: no  Pressure?: no  Stop Score: 1  Body Mass Index more than 35 kg/m2?: yes  Age older than 50 year old?: no  Neck large? \">17\"/43cm-M, >16\"/41cm-F: yes  Gender=Male?: yes  Total Stop-Bang Score: 4      Past weight loss attempts:   Fasting, calorie counting, weight watchers, plenity       Past Medical History:   Diagnosis Date    Allergic 2000    Pennicillin and omoxycillan    Obesity    Childhood asthma     History reviewed. No pertinent surgical history.  [place] teeth     Allergies   Allergen Reactions    Penicillins Hives    Amoxicillin Rash    Cephalexin Rash         Current Outpatient Medications:     Multi-Vitamin tablet tablet, , Disp: , Rfl:     vitamin D (ERGOCALCIFEROL) 1.25 MG (70600 UT) capsule capsule, Take 1 capsule by mouth 1 (One) Time Per Week., Disp: 12 capsule, Rfl: 0    Social History     Socioeconomic History    Marital status: Single   Tobacco Use    Smoking status: Never     Passive exposure: Never    Smokeless tobacco: Never   Vaping Use    Vaping status: Never Used "   Substance and Sexual Activity    Alcohol use: Not Currently    Drug use: Never    Sexual activity: Defer       Family History   Problem Relation Age of Onset    Other Mother     Hypertension Mother     Hyperlipidemia Father     Thyroid disease Father 52        oncocytic follicular thyroid carcinoma   Brain cancer - grandfather - paternal       Review of Systems:  Review of Systems   Constitutional:         Allergies    Respiratory: Negative.     Cardiovascular:         Shortness of breath on exertion , HLD- no meds   Gastrointestinal: Negative.    Endocrine: Negative.    Genitourinary: Negative.    Musculoskeletal:  Positive for back pain.   Skin: Negative.    Neurological: Negative.    Hematological: Negative.    Psychiatric/Behavioral: Negative.         Physical Exam:  Vital Signs:  Weight: (!) 192 kg (423 lb 9.6 oz)   Body mass index is 59.08 kg/m².  Temp: (not recorded)   Heart Rate: 88   BP: 131/88     Physical Exam  Constitutional:       Appearance: Normal appearance. He is obese.   Cardiovascular:      Rate and Rhythm: Normal rate.   Pulmonary:      Effort: Pulmonary effort is normal.      Breath sounds: Normal breath sounds.   Abdominal:      General: Abdomen is flat.      Palpations: Abdomen is soft.   Skin:     General: Skin is warm and dry.   Neurological:      General: No focal deficit present.      Mental Status: He is alert and oriented to person, place, and time.   Psychiatric:         Mood and Affect: Mood normal.         Behavior: Behavior normal.         Thought Content: Thought content normal.         Judgment: Judgment normal.            Assessment:         Juvenal Herrera is a 24 y.o. year old male with medically complicated severe obesity. Weight: (!) 192 kg (423 lb 9.6 oz), Body mass index is 59.08 kg/m². and weight related problems.    I explained in detail the procedures that we are performing.  All of those procedures can be performed laparoscopically but there is a chance to convert to  open if any technical challenges or complications do occur.  Bariatric surgery is not cosmetic surgery but rather a tool to help a patient make a life-long commitment lifestyle changes including diet, exercise, behavior changes, and taking supplemental vitamins and minerals.    Due to the patient's BMI and co-morbidities they are at a high risk for surgery and will obtain the following:   A psychological evaluation will be arranged for this patient. CBC, CMP,  TSH and HgbA1C will be drawn.     Juvenal Herrera will be set up for a pre-operative diagnostic esophagogastroduodenoscopy with biopsy for evaluation. The risks and benefits of the procedure were discussed with the patient in detail and all questions were answered.  Possibility of perforation, bleeding, aspiration, anoxic brain injury, respiratory and/or cardiac arrest and death were discussed.   He received handouts regarding, all questions were answered and informed consent was obtained.     The risks, benefits, alternatives, and potential complications of all of the procedures were explained in detail including, but not limited to death, anesthesia and medication adverse effect/DVT, pulmonary embolism, trocar site/incisional hernia, wound infection, abdominal infection, bleeding, failure to lose weight or gain weight and change in body image, metabolic complications with calcium, thiamine, vitamin B12, folate, iron, and anemia.    The patient was advised to start a high protein, low fat and low carbohydrate diet. The patient was given individualized information  along with general group information and handouts.     The patient was encouraged to start routine exercise including but not limited to 150 minutes per week.     The consultation plan was reviewed with the patient.    The patient understands the surgical procedures and the different surgical options that are available.  He understands the lifestyle changes that would be required after surgery and  has agreed to participate in a pre-operative and postoperative weight management program.  He also expressed understanding of possible risks, had several questions answered and desires to proceed.    I think he is a good candidate for this surgery, and is interested in a sleeve gastrectomy.    Encounter Diagnosis   Name Primary?    Class 3 obesity Yes       Plan:    Patient will have evaluations and follow up with bariatric dieticians and a psychologist before undergoing a multidisciplinary review of his candidacy.  We also discussed the weight loss requirement and rationale, and other program requirements.    PT will need an EGD prior to bariatric surgery. Plan to follow up after EGD with DR. Lassiter in the office.     Total time spent with pt 45 minutes of which 30 minutes were spent on education.     Mitzi Stevens, APRN  3/4/2025

## 2025-03-04 NOTE — PROGRESS NOTES
"Nutrition Services    Patient Name: Juvenal Herrera  YOB: 2000  MRN: [id number]  Date of Service: 03/04/25      ICD-10-CM ICD-9-CM   1. Class 3 obesity  E66.813 278.01   2. Pre-op exam  Z01.818 V72.84        RD Recommendation        Candidacy for surgery? Good   RD Comments Recommend patient for surgery    Procedure Patient is pursuing VSG     NUTRITION ASSESSMENT - BARIATRIC SURGERY      Reason for Visit RDN pamela for surgery, SWL 1/0     H&P      Past Medical History:   Diagnosis Date    Allergic 2000    Pennicillin and omoxycillan    Obesity        History reviewed. No pertinent surgical history.     Previous Goals          Encounter Information        Visit Narrative     Patient reports intake on workdays vs weekends is different. Patient reports he eats slowly now. Patient does currently eat and drink together. 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    Diet Recall:   Breakfast: valdez egg and cheese on english muffin, crackers   Lunch: leftovers form dinner (chicken and corn)  Dinner: steak with potatoes, pesto chicken pasta  Snacks: no snacks usually, applesauce sometimes   Beverages: energy drink, water, zero sodas    Exercise: patient does not currently go to the gym but tries to stay active at home/work    Supplements: MV, vit D    Self Monitoring:          Anthropometrics        Current Height, Weight Height: 180.3 cm (71\")  Weight: (!) 192 kg (423 lb 9.6 oz) (03/04/25 0822)       Trending Weight Hx      Wt Readings from Last 30 Encounters:   03/04/25 0822 (!) 192 kg (423 lb 9.6 oz)   02/27/25 1531 (!) 195 kg (430 lb 3.2 oz)   11/27/24 1515 (!) 194 kg (427 lb)   01/05/24 1602 (!) 190 kg (419 lb)   10/05/23 1600 (!) 189 kg (416 lb 12.8 oz)   05/31/23 0919 (!) 189 kg (417 lb)   05/03/23 0935 (!) 187 kg (413 lb)   11/23/22 1156 (!) 190 kg (419 lb)   11/02/22 1107 (!) 189 kg (416 lb)      BMI kg/m2 Body mass index is 59.08 kg/m².       Nutrition Diagnosis         Nutrition Dx " Statement Overweight/obesity RT multifactorial biochemical, behavioral and environmental contributors to disease AEB BMI 59.08 kg/m^2         Nutrition Intervention         Nutrition Intervention Nutrition education related to diet modification and physical activity        Monitor/Evaluation        New Goals Patient to eat and drink separately with 1 meal  Patient to eliminate sodas       Total time spent with pt 15 minutes of which 15 minutes were spent on education.       Electronically signed by:  Paresh Baugh RD  03/04/25 14:12 EST

## 2025-03-04 NOTE — PROGRESS NOTES
"Juvenal Herrera is a 24 y.o. male with morbid obesity with co-morbidities including none    [facility] BARIATRIC SURGERY  [address] [place] [address]  [address] [place] [address]  Dept: [phone]  3/4/2025      Juvenal Herrera.  [id number]  [id number]  2000  male      Chief Complaint of weight gain; unable to maintain weight loss    History of Present Illness:   Juvenal is a 24 y.o. male who presents today for evaluation, education and consultation regarding weight loss surgery. The patient is interested in the sleeve gastrectomy.      Diet History:See dietician/RN/MA documentation for complete history of weight and diet.     Bariatric Surgery Evaluation: The patient is being seen for an initial visit for bariatric surgery evaluation.     ;STOP-Bang Score  Have you been diagnosed with Sleep Apnea?: no  Snoring?: no  Tired?: yes  Observed?: no  Pressure?: no  Stop Score: 1  Body Mass Index more than 35 kg/m2?: yes  Age older than 50 year old?: no  Neck large? \">17\"/43cm-M, >16\"/41cm-F: yes  Gender=Male?: yes  Total Stop-Bang Score: 4      Past weight loss attempts:   Fasting, calorie counting, weight watchers, plenity       Past Medical History:   Diagnosis Date    Allergic 2000    Pennicillin and omoxycillan    Obesity    Childhood asthma     History reviewed. No pertinent surgical history.  [place] teeth     Allergies   Allergen Reactions    Penicillins Hives    Amoxicillin Rash    Cephalexin Rash         Current Outpatient Medications:     Multi-Vitamin tablet tablet, , Disp: , Rfl:     vitamin D (ERGOCALCIFEROL) 1.25 MG (50002 UT) capsule capsule, Take 1 capsule by mouth 1 (One) Time Per Week., Disp: 12 capsule, Rfl: 0    Social History     Socioeconomic History    Marital status: Single   Tobacco Use    Smoking status: Never     Passive exposure: Never    Smokeless tobacco: Never   Vaping Use    Vaping status: Never Used "   Substance and Sexual Activity    Alcohol use: Not Currently    Drug use: Never    Sexual activity: Defer       Family History   Problem Relation Age of Onset    Other Mother     Hypertension Mother     Hyperlipidemia Father     Thyroid disease Father 52        oncocytic follicular thyroid carcinoma   Brain cancer - grandfather - paternal       Review of Systems:  Review of Systems   Constitutional:         Allergies    Respiratory: Negative.     Cardiovascular:         Shortness of breath on exertion , HLD- no meds   Gastrointestinal: Negative.    Endocrine: Negative.    Genitourinary: Negative.    Musculoskeletal:  Positive for back pain.   Skin: Negative.    Neurological: Negative.    Hematological: Negative.    Psychiatric/Behavioral: Negative.         Physical Exam:  Vital Signs:  Weight: (!) 192 kg (423 lb 9.6 oz)   Body mass index is 59.08 kg/m².  Temp: (not recorded)   Heart Rate: 88   BP: 131/88     Physical Exam  Constitutional:       Appearance: Normal appearance. He is obese.   Cardiovascular:      Rate and Rhythm: Normal rate.   Pulmonary:      Effort: Pulmonary effort is normal.      Breath sounds: Normal breath sounds.   Abdominal:      General: Abdomen is flat.      Palpations: Abdomen is soft.   Skin:     General: Skin is warm and dry.   Neurological:      General: No focal deficit present.      Mental Status: He is alert and oriented to person, place, and time.   Psychiatric:         Mood and Affect: Mood normal.         Behavior: Behavior normal.         Thought Content: Thought content normal.         Judgment: Judgment normal.            Assessment:         Juvenal Herrera is a 24 y.o. year old male with medically complicated severe obesity. Weight: (!) 192 kg (423 lb 9.6 oz), Body mass index is 59.08 kg/m². and weight related problems.    I explained in detail the procedures that we are performing.  All of those procedures can be performed laparoscopically but there is a chance to convert to  open if any technical challenges or complications do occur.  Bariatric surgery is not cosmetic surgery but rather a tool to help a patient make a life-long commitment lifestyle changes including diet, exercise, behavior changes, and taking supplemental vitamins and minerals.    Due to the patient's BMI and co-morbidities they are at a high risk for surgery and will obtain the following:   A psychological evaluation will be arranged for this patient. CBC, CMP,  TSH and HgbA1C will be drawn.     Juvenal Herrera will be set up for a pre-operative diagnostic esophagogastroduodenoscopy with biopsy for evaluation. The risks and benefits of the procedure were discussed with the patient in detail and all questions were answered.  Possibility of perforation, bleeding, aspiration, anoxic brain injury, respiratory and/or cardiac arrest and death were discussed.   He received handouts regarding, all questions were answered and informed consent was obtained.     The risks, benefits, alternatives, and potential complications of all of the procedures were explained in detail including, but not limited to death, anesthesia and medication adverse effect/DVT, pulmonary embolism, trocar site/incisional hernia, wound infection, abdominal infection, bleeding, failure to lose weight or gain weight and change in body image, metabolic complications with calcium, thiamine, vitamin B12, folate, iron, and anemia.    The patient was advised to start a high protein, low fat and low carbohydrate diet. The patient was given individualized information  along with general group information and handouts.     The patient was encouraged to start routine exercise including but not limited to 150 minutes per week.     The consultation plan was reviewed with the patient.    The patient understands the surgical procedures and the different surgical options that are available.  He understands the lifestyle changes that would be required after surgery and  has agreed to participate in a pre-operative and postoperative weight management program.  He also expressed understanding of possible risks, had several questions answered and desires to proceed.    I think he is a good candidate for this surgery, and is interested in a sleeve gastrectomy.    Encounter Diagnosis   Name Primary?    Class 3 obesity Yes       Plan:    Patient will have evaluations and follow up with bariatric dieticians and a psychologist before undergoing a multidisciplinary review of his candidacy.  We also discussed the weight loss requirement and rationale, and other program requirements.    PT will need an EGD prior to bariatric surgery. Plan to follow up after EGD with DR. Lassiter in the office.     Total time spent with pt 45 minutes of which 30 minutes were spent on education.     Mitzi Stevens, APRN  3/4/2025

## 2025-03-31 ENCOUNTER — ANESTHESIA EVENT (OUTPATIENT)
Dept: GASTROENTEROLOGY | Facility: HOSPITAL | Age: 25
End: 2025-03-31
Payer: COMMERCIAL

## 2025-03-31 ENCOUNTER — ANESTHESIA (OUTPATIENT)
Dept: GASTROENTEROLOGY | Facility: HOSPITAL | Age: 25
End: 2025-03-31
Payer: COMMERCIAL

## 2025-03-31 ENCOUNTER — HOSPITAL ENCOUNTER (OUTPATIENT)
Facility: HOSPITAL | Age: 25
Setting detail: HOSPITAL OUTPATIENT SURGERY
Discharge: HOME | End: 2025-03-31
Attending: SURGERY | Admitting: SURGERY
Payer: COMMERCIAL

## 2025-03-31 VITALS
BODY MASS INDEX: 44.1 KG/M2 | DIASTOLIC BLOOD PRESSURE: 91 MMHG | HEART RATE: 77 BPM | HEIGHT: 71 IN | RESPIRATION RATE: 11 BRPM | TEMPERATURE: 98.2 F | WEIGHT: 315 LBS | OXYGEN SATURATION: 94 % | SYSTOLIC BLOOD PRESSURE: 127 MMHG

## 2025-03-31 DIAGNOSIS — E66.813 CLASS 3 OBESITY: ICD-10-CM

## 2025-03-31 PROCEDURE — 43239 EGD BIOPSY SINGLE/MULTIPLE: CPT | Performed by: SURGERY

## 2025-03-31 PROCEDURE — 88305 TISSUE EXAM BY PATHOLOGIST: CPT | Performed by: SURGERY

## 2025-03-31 PROCEDURE — 25810000003 SODIUM CHLORIDE 0.9 % SOLUTION: Performed by: NURSE ANESTHETIST, CERTIFIED REGISTERED

## 2025-03-31 PROCEDURE — 25810000003 SODIUM CHLORIDE 0.9 % SOLUTION: Performed by: SURGERY

## 2025-03-31 PROCEDURE — 25010000002 PROPOFOL 10 MG/ML EMULSION: Performed by: NURSE ANESTHETIST, CERTIFIED REGISTERED

## 2025-03-31 RX ORDER — HYDRALAZINE HYDROCHLORIDE 20 MG/ML
5 INJECTION INTRAMUSCULAR; INTRAVENOUS
Status: DISCONTINUED | OUTPATIENT
Start: 2025-03-31 | End: 2025-03-31 | Stop reason: HOSPADM

## 2025-03-31 RX ORDER — LABETALOL HYDROCHLORIDE 5 MG/ML
5 INJECTION, SOLUTION INTRAVENOUS
Status: DISCONTINUED | OUTPATIENT
Start: 2025-03-31 | End: 2025-03-31 | Stop reason: HOSPADM

## 2025-03-31 RX ORDER — ONDANSETRON 2 MG/ML
4 INJECTION INTRAMUSCULAR; INTRAVENOUS ONCE AS NEEDED
Status: DISCONTINUED | OUTPATIENT
Start: 2025-03-31 | End: 2025-03-31 | Stop reason: HOSPADM

## 2025-03-31 RX ORDER — MEPERIDINE HYDROCHLORIDE 25 MG/ML
12.5 INJECTION INTRAMUSCULAR; INTRAVENOUS; SUBCUTANEOUS
Status: DISCONTINUED | OUTPATIENT
Start: 2025-03-31 | End: 2025-03-31 | Stop reason: HOSPADM

## 2025-03-31 RX ORDER — EPHEDRINE SULFATE 5 MG/ML
5 INJECTION INTRAVENOUS ONCE AS NEEDED
Status: DISCONTINUED | OUTPATIENT
Start: 2025-03-31 | End: 2025-03-31 | Stop reason: HOSPADM

## 2025-03-31 RX ORDER — SODIUM CHLORIDE 9 MG/ML
INJECTION, SOLUTION INTRAVENOUS CONTINUOUS PRN
Status: DISCONTINUED | OUTPATIENT
Start: 2025-03-31 | End: 2025-03-31 | Stop reason: SURG

## 2025-03-31 RX ORDER — IPRATROPIUM BROMIDE AND ALBUTEROL SULFATE 2.5; .5 MG/3ML; MG/3ML
3 SOLUTION RESPIRATORY (INHALATION) ONCE AS NEEDED
Status: DISCONTINUED | OUTPATIENT
Start: 2025-03-31 | End: 2025-03-31 | Stop reason: HOSPADM

## 2025-03-31 RX ORDER — PROPOFOL 10 MG/ML
VIAL (ML) INTRAVENOUS AS NEEDED
Status: DISCONTINUED | OUTPATIENT
Start: 2025-03-31 | End: 2025-03-31 | Stop reason: SURG

## 2025-03-31 RX ORDER — DIPHENHYDRAMINE HYDROCHLORIDE 50 MG/ML
12.5 INJECTION, SOLUTION INTRAMUSCULAR; INTRAVENOUS
Status: DISCONTINUED | OUTPATIENT
Start: 2025-03-31 | End: 2025-03-31 | Stop reason: HOSPADM

## 2025-03-31 RX ORDER — SODIUM CHLORIDE 9 MG/ML
10 INJECTION, SOLUTION INTRAVENOUS ONCE
Status: COMPLETED | OUTPATIENT
Start: 2025-03-31 | End: 2025-03-31

## 2025-03-31 RX ADMIN — SODIUM CHLORIDE: 9 INJECTION, SOLUTION INTRAVENOUS at 13:17

## 2025-03-31 RX ADMIN — PROPOFOL 300 MG: 10 INJECTION, EMULSION INTRAVENOUS at 13:19

## 2025-03-31 RX ADMIN — SODIUM CHLORIDE 10 ML/HR: 9 INJECTION, SOLUTION INTRAVENOUS at 12:32

## 2025-03-31 NOTE — ANESTHESIA PREPROCEDURE EVALUATION
Anesthesia Evaluation     Patient summary reviewed and Nursing notes reviewed   NPO Solid Status: > 8 hours  NPO Liquid Status: > 8 hours           Airway   Dental      Pulmonary    (+) ,sleep apnea  Cardiovascular - negative cardio ROS  Exercise tolerance: unable to assess    ECG reviewed        Neuro/Psych- negative ROS  GI/Hepatic/Renal/Endo    (+) morbid obesity    Musculoskeletal (-) negative ROS    Abdominal    Substance History - negative use     OB/GYN negative ob/gyn ROS         Other - negative ROS                   Anesthesia Plan    ASA 4     general     (Consider  BiPAP)  intravenous induction     Anesthetic plan, risks, benefits, and alternatives have been provided, discussed and informed consent has been obtained with: patient.    CODE STATUS:

## 2025-03-31 NOTE — DISCHARGE INSTRUCTIONS
A responsible adult should stay with you and you should rest quietly for the rest of the day.    Do not drink alcohol, drive, operate any heavy machinery or power tools or make any legal/important decisions for the next 24 hours.     Progress your diet as tolerated.  If you begin to experience severe pain, increased shortness of breath, racing heartbeat or a fever above 101 F, seek immediate medical attention.     Follow up with MD as instructed. Call office for results in 3 to 5 days if needed.     Dr Lassiter's Office at [phone]

## 2025-03-31 NOTE — ANESTHESIA POSTPROCEDURE EVALUATION
Patient: Juvenal Herrera    Procedure Summary       Date: 03/31/25 Room / Location: [facility] ENDOSCOPY 4 / [facility] ENDOSCOPY    Anesthesia Start: 1317 Anesthesia Stop: 1328    Procedure: ESOPHAGOGASTRODUODENOSCOPY WITH BIOPSY X1 AREAS Diagnosis:       Class 3 obesity      (Class 3 obesity [E66.813])    Surgeons: Cecelia Lassiter MD Provider: Ruel Love MD    Anesthesia Type: general ASA Status: 4            Anesthesia Type: general    Vitals  Vitals Value Taken Time   /91 03/31/25 13:56   Temp     Pulse 89 03/31/25 13:59   Resp 11 03/31/25 13:56   SpO2 96 % 03/31/25 13:59   Vitals shown include unfiled device data.        Post Anesthesia Care and Evaluation    Patient location during evaluation: PACU  Patient participation: complete - patient participated  Level of consciousness: awake  Pain scale: See nurse's notes for pain score.  Pain management: adequate    Airway patency: patent  Anesthetic complications: No anesthetic complications  PONV Status: none  Cardiovascular status: acceptable  Respiratory status: acceptable and spontaneous ventilation  Hydration status: acceptable    Comments: Patient seen and examined postoperatively; vital signs stable; SpO2 greater than or equal to 90%; cardiopulmonary status stable; nausea/vomiting adequately controlled; pain adequately controlled; no apparent anesthesia complications; patient discharged from anesthesia care when discharge criteria were met     Acute medical problem

## 2025-03-31 NOTE — OP NOTE
Surgeon:  Cecelia Lassiter MD  Preoperative Diagnosis: Screening for bariatric surgery    Postoperative Diagnosis: normal    Procedure Performed: Esophagogastroduodenoscopy with biopsy of the antrum to check for H. pylori    Indications: The patient is interested in bariatric surgery for weight loss.  This is a screening EGD.      Specimen: biopsy of gastric antrum for H. Pylori    EBL: none    Procedure:     The procedure, indications, preparation and potential complications were explained to the patient, who indicated understanding and signed the corresponding consent forms.  The patient was identified, taken to the endoscopy suite, and placed on the left side down decubitus position.  The patient underwent a MAC anesthesia and was appropriately monitored through the case by the anesthesia personnel with continuous pulse oximetry, blood pressure, and cardiac monitoring.  A bite block was placed.  After adequate IV sedation and using a transoral technique a lubed flexible endoscope was placed in the hypopharynx and advanced to the second portion of the duodenum without difficulty. The scope was then withdrawn back into the antrum of the stomach.  Cold forcep biopsies of the antrum were taken to rule out Helicobacter pylori.  The scope was retroflexed noting the body, fundus and cardia.  The scope was then withdrawn back into the esophagus after decompressing the stomach.  The Z line was noted and GE junction measured from the incisors.  The scope was then completely withdrawn.  The patient tolerated the procedure well and left the endoscopy suite in stable condition.  The findings are listed below.      normal

## 2025-04-02 LAB
LAB AP CASE REPORT: NORMAL
PATH REPORT.FINAL DX SPEC: NORMAL
PATH REPORT.GROSS SPEC: NORMAL

## 2025-04-11 ENCOUNTER — LAB (OUTPATIENT)
Dept: LAB | Facility: HOSPITAL | Age: 25
End: 2025-04-11
Payer: COMMERCIAL

## 2025-04-11 ENCOUNTER — PREP FOR SURGERY (OUTPATIENT)
Dept: OTHER | Facility: HOSPITAL | Age: 25
End: 2025-04-11
Payer: COMMERCIAL

## 2025-04-11 ENCOUNTER — CONSULT (OUTPATIENT)
Dept: BARIATRICS/WEIGHT MGMT | Facility: CLINIC | Age: 25
End: 2025-04-11
Payer: COMMERCIAL

## 2025-04-11 VITALS
BODY MASS INDEX: 44.1 KG/M2 | DIASTOLIC BLOOD PRESSURE: 81 MMHG | HEIGHT: 71 IN | RESPIRATION RATE: 18 BRPM | WEIGHT: 315 LBS | OXYGEN SATURATION: 99 % | SYSTOLIC BLOOD PRESSURE: 137 MMHG | HEART RATE: 94 BPM

## 2025-04-11 DIAGNOSIS — E66.813 CLASS 3 OBESITY: Primary | ICD-10-CM

## 2025-04-11 DIAGNOSIS — E66.813 CLASS 3 OBESITY: ICD-10-CM

## 2025-04-11 PROBLEM — E66.01 OBESITY, CLASS III, BMI 40-49.9 (MORBID OBESITY): Status: ACTIVE | Noted: 2025-04-11

## 2025-04-11 LAB
25(OH)D3 SERPL-MCNC: 18.8 NG/ML (ref 30–100)
ALBUMIN SERPL-MCNC: 4.2 G/DL (ref 3.5–5.2)
ALBUMIN/GLOB SERPL: 1 G/DL
ALP SERPL-CCNC: 112 U/L (ref 39–117)
ALT SERPL W P-5'-P-CCNC: 38 U/L (ref 1–41)
ANION GAP SERPL CALCULATED.3IONS-SCNC: 9.4 MMOL/L (ref 5–15)
AST SERPL-CCNC: 22 U/L (ref 1–40)
BASOPHILS # BLD AUTO: 0.05 10*3/MM3 (ref 0–0.2)
BASOPHILS NFR BLD AUTO: 0.5 % (ref 0–1.5)
BILIRUB SERPL-MCNC: 0.3 MG/DL (ref 0–1.2)
BUN SERPL-MCNC: 21 MG/DL (ref 6–20)
BUN/CREAT SERPL: 24.1 (ref 7–25)
CALCIUM SPEC-SCNC: 9.5 MG/DL (ref 8.6–10.5)
CHLORIDE SERPL-SCNC: 105 MMOL/L (ref 98–107)
CO2 SERPL-SCNC: 26.6 MMOL/L (ref 22–29)
CREAT SERPL-MCNC: 0.87 MG/DL (ref 0.76–1.27)
DEPRECATED RDW RBC AUTO: 44 FL (ref 37–54)
EGFRCR SERPLBLD CKD-EPI 2021: 122.8 ML/MIN/1.73
EOSINOPHIL # BLD AUTO: 0.06 10*3/MM3 (ref 0–0.4)
EOSINOPHIL NFR BLD AUTO: 0.6 % (ref 0.3–6.2)
ERYTHROCYTE [DISTWIDTH] IN BLOOD BY AUTOMATED COUNT: 13.6 % (ref 12.3–15.4)
GLOBULIN UR ELPH-MCNC: 4.1 GM/DL
GLUCOSE SERPL-MCNC: 118 MG/DL (ref 65–99)
HBA1C MFR BLD: 5.26 % (ref 4.8–5.6)
HCT VFR BLD AUTO: 45.6 % (ref 37.5–51)
HGB BLD-MCNC: 14.5 G/DL (ref 13–17.7)
IMM GRANULOCYTES # BLD AUTO: 0.05 10*3/MM3 (ref 0–0.05)
IMM GRANULOCYTES NFR BLD AUTO: 0.5 % (ref 0–0.5)
LYMPHOCYTES # BLD AUTO: 2.18 10*3/MM3 (ref 0.7–3.1)
LYMPHOCYTES NFR BLD AUTO: 23.2 % (ref 19.6–45.3)
MCH RBC QN AUTO: 28 PG (ref 26.6–33)
MCHC RBC AUTO-ENTMCNC: 31.8 G/DL (ref 31.5–35.7)
MCV RBC AUTO: 88.2 FL (ref 79–97)
MONOCYTES # BLD AUTO: 0.81 10*3/MM3 (ref 0.1–0.9)
MONOCYTES NFR BLD AUTO: 8.6 % (ref 5–12)
NEUTROPHILS NFR BLD AUTO: 6.24 10*3/MM3 (ref 1.7–7)
NEUTROPHILS NFR BLD AUTO: 66.6 % (ref 42.7–76)
NRBC BLD AUTO-RTO: 0 /100 WBC (ref 0–0.2)
PLATELET # BLD AUTO: 335 10*3/MM3 (ref 140–450)
PMV BLD AUTO: 9 FL (ref 6–12)
POTASSIUM SERPL-SCNC: 4.7 MMOL/L (ref 3.5–5.2)
PROT SERPL-MCNC: 8.3 G/DL (ref 6–8.5)
RBC # BLD AUTO: 5.17 10*6/MM3 (ref 4.14–5.8)
SODIUM SERPL-SCNC: 141 MMOL/L (ref 136–145)
TSH SERPL DL<=0.05 MIU/L-ACNC: 0.97 UIU/ML (ref 0.27–4.2)
WBC NRBC COR # BLD AUTO: 9.39 10*3/MM3 (ref 3.4–10.8)

## 2025-04-11 PROCEDURE — 83036 HEMOGLOBIN GLYCOSYLATED A1C: CPT

## 2025-04-11 PROCEDURE — 80305 DRUG TEST PRSMV DIR OPT OBS: CPT

## 2025-04-11 PROCEDURE — 82306 VITAMIN D 25 HYDROXY: CPT

## 2025-04-11 PROCEDURE — 80050 GENERAL HEALTH PANEL: CPT

## 2025-04-11 PROCEDURE — 36415 COLL VENOUS BLD VENIPUNCTURE: CPT

## 2025-04-11 RX ORDER — SODIUM CHLORIDE 0.9 % (FLUSH) 0.9 %
3-10 SYRINGE (ML) INJECTION AS NEEDED
OUTPATIENT
Start: 2025-04-11

## 2025-04-11 RX ORDER — SODIUM CHLORIDE, SODIUM LACTATE, POTASSIUM CHLORIDE, CALCIUM CHLORIDE 600; 310; 30; 20 MG/100ML; MG/100ML; MG/100ML; MG/100ML
100 INJECTION, SOLUTION INTRAVENOUS CONTINUOUS
OUTPATIENT
Start: 2025-04-11 | End: 2025-04-13

## 2025-04-11 RX ORDER — SODIUM CHLORIDE 0.9 % (FLUSH) 0.9 %
3 SYRINGE (ML) INJECTION EVERY 12 HOURS SCHEDULED
OUTPATIENT
Start: 2025-04-11

## 2025-04-11 RX ORDER — SCOPOLAMINE 1 MG/3D
1 PATCH, EXTENDED RELEASE TRANSDERMAL CONTINUOUS
OUTPATIENT
Start: 2025-04-11 | End: 2025-04-14

## 2025-04-11 RX ORDER — ACETAMINOPHEN 10 MG/ML
1000 INJECTION, SOLUTION INTRAVENOUS ONCE
OUTPATIENT
Start: 2025-04-11 | End: 2025-04-11

## 2025-04-11 RX ORDER — PANTOPRAZOLE SODIUM 40 MG/10ML
40 INJECTION, POWDER, LYOPHILIZED, FOR SOLUTION INTRAVENOUS ONCE
OUTPATIENT
Start: 2025-04-11 | End: 2025-04-11

## 2025-04-11 RX ORDER — CHLORHEXIDINE GLUCONATE ORAL RINSE 1.2 MG/ML
15 SOLUTION DENTAL SEE ADMIN INSTRUCTIONS
OUTPATIENT
Start: 2025-04-11

## 2025-04-11 NOTE — PROGRESS NOTES
Bariatric Consult:    Chief Complaint: Morbid Obesity  Referred by Jessi Rivera APRN    Juvenal Herrera is here today for consult on Morbid Obesity    History of Present Illness:     Juvenal Herrera is a 25 y.o. male with morbid obesity with co-morbidities including  has a past medical history of Allergic (2000) and Obesity.  who presents for surgical consultation for the above procedure. Juvenal has completed the initial intake visit and has been examined by our nurse practitioner, dietician, psychologist and underwent the extensive educational teaching process under the guidance of our bariatric coordinator and myself. Juvenal also has seen the educational video FROYLAN on the surgical procedure if available. Juvenal attended today more educational teaching from our bariatric coordinator and myself. Juvenal has had an extensive medical workup including a visit with their primary care physician, EKG, chest radiograph, blood work, EGD or UGI and possibly further testing. These have been reviewed by me and discussed with the patient. Juvenal is now ready to proceed with surgery. Juvenal presently denies nausea, vomiting, fever, chills, chest pain, shortness of air, melena, hematochezia, hemetemesis, dysuria, frequency, hematuria, jaundice or abdominal pain.     Wt Readings from Last 10 Encounters:   04/11/25 (!) 190 kg (419 lb 1.6 oz)   03/31/25 (!) 196 kg (431 lb)   03/04/25 (!) 192 kg (423 lb 9.6 oz)   02/27/25 (!) 195 kg (430 lb 3.2 oz)   11/27/24 (!) 194 kg (427 lb)   01/05/24 (!) 190 kg (419 lb)   10/05/23 (!) 189 kg (416 lb 12.8 oz)   05/31/23 (!) 189 kg (417 lb)   05/03/23 (!) 187 kg (413 lb)   11/23/22 (!) 190 kg (419 lb)       The  pre-op EGD shows   normal    Past Medical History:   Diagnosis Date    Allergic 2000    Pennicillin and omoxycillan    Obesity        No diagnosis found.    Past Surgical History:   Procedure Laterality Date    ENDOSCOPY N/A 3/31/2025    Procedure:  Pharmacy Dosing Services: Vancomycin    Consult for Vancomycin Dosing by Pharmacy by Dr. Cali Payment provided for this 48y.o. year old male , for indication of skin and soft tissue. Ht Readings from Last 1 Encounters:   07/24/17 177.8 cm (70\")        Wt Readings from Last 1 Encounters:   07/25/17 86.2 kg (190 lb 0.1 oz)        Previous Regimen Vancomycin 1250 mg IV q 12hr   Last Level 6.2 @ 2120 on 07/26/2017   Other Current Antibiotics Levofloxacin 750 mg IV q 24 hr   Serum Creatinine Lab Results   Component Value Date/Time    Creatinine 0.85 07/26/2017 01:10 AM      Creatinine Clearance Estimated Creatinine Clearance: 103.8 mL/min (based on Cr of 0.85). BUN Lab Results   Component Value Date/Time    BUN 8 07/26/2017 01:10 AM      WBC Lab Results   Component Value Date/Time    WBC 14.8 07/26/2017 01:10 AM      H/H Lab Results   Component Value Date/Time    HGB 12.6 07/26/2017 01:10 AM      Platelets Lab Results   Component Value Date/Time    PLATELET 638 91/23/3479 01:10 AM      Temp 99.7 °F (37.6 °C)           Dose calculated to approximate a therapeutic trough of est 12.9 with in range of 10-15mcg/mL. Pharmacy to follow daily and will make changes to dose and/or frequency based on clinical status.     Additional recommendations regarding antibiotic therapy:  _________________________________     Pharmacist DANA NoeD ESOPHAGOGASTRODUODENOSCOPY WITH BIOPSY X1 AREAS;  Surgeon: Cecelia Lassiter MD;  Location: [facility] ENDOSCOPY;  Service: General;  Laterality: N/A;  Post: NORMAL EGD    WISDOM TOOTH EXTRACTION           * No active hospital problems. *      Allergies   Allergen Reactions    Penicillins Hives    Amoxicillin Rash    Cephalexin Rash         Current Outpatient Medications:     Multi-Vitamin tablet tablet, , Disp: , Rfl:     vitamin D (ERGOCALCIFEROL) 1.25 MG (28009 UT) capsule capsule, Take 1 capsule by mouth 1 (One) Time Per Week., Disp: 12 capsule, Rfl: 0    Social History     Socioeconomic History    Marital status: Single   Tobacco Use    Smoking status: Never     Passive exposure: Never    Smokeless tobacco: Never   Vaping Use    Vaping status: Never Used   Substance and Sexual Activity    Alcohol use: Not Currently    Drug use: Yes     Types: Other     Comment: THC gummies    Sexual activity: Defer       Family History   Problem Relation Age of Onset    Other Mother     Hypertension Mother     Hyperlipidemia Father     Thyroid disease Father 52        oncocytic follicular thyroid carcinoma       Review of Systems:  Review of Systems    Review of Systems   Constitutional: Negative.    HENT: Negative.    Eyes: Negative.    Respiratory: Negative.    Cardiovascular: Negative.    Gastrointestinal: Negative.    Endocrine: Negative.    Genitourinary: Negative.    Musculoskeletal: Negative.    Skin: Negative.    Allergic/Immunologic: Negative.    Neurological: Negative.    Hematological: Negative.    Psychiatric/Behavioral: Negative.      Physical Exam:  Body mass index is 58.45 kg/m².   Vital Signs:  Weight: (!) 190 kg (419 lb 1.6 oz)   Body mass index is 58.45 kg/m².  Temp: (not recorded)   Heart Rate: 94   BP: 137/81     Awake and alert  Normal mental status  Normal pulmonary effort  Abdomen appropriate tenderness  Incisions no erythema  Extremities no tenderness or swelling      Assessment:    Juvenal Herrera is a 25 y.o.  year old male with morbid obesity with a BMI of Body mass index is 58.45 kg/m². and multiple co-morbidities listed in the encounter diagnosis.    I think he is an appropriate candidate for this surgery, and is ready to proceed.      The patient has returned to the office for a surgical consultation and has requested to proceed with a laparoscopic gastric sleeve.  I have had the opportunity to obtain a history, examine the patient and review the patient's chart.    The patient understands that surgery is a tool and that weight loss is not guaranteed but only seen in the context of appropriate use, regular follow up, exercise and making appropriate food choices.     I personally discussed the potential complications of the laparoscopic gastric sleeve with this patient.  The patient is well aware of potential complications of the surgery that include but not limited to bleeding, infections, deep vein thrombosis, pulmonary embolism, pulmonary complications such as pneumonia, cardiac event, hernias, small bowel obstruction, damage to the spleen or other organs, bowel injury, disfiguring scars, failure to lose weight, need for additional surgery, conversion to an open procedure and death.  The patient is also aware of complications which apply in particular to the gastric sleeve and can include but not limited to the leakage of gastric contents at the staple line, the development of an intra-abdominal abscess, gastroesophageal reflux disease, Arteaga's esophagus, ulcers, vitamin/mineral deficiencies, strictures, and the possibility of converting this procedure to a Briseida-en-Y gastric bypass. The patient also understands the possibility of requiring an acid reducer medication for the rest of their life.    The risks, benefits, potential complications and alternative therapies were discussed at great length as outlined in our extensive consent forms, online consent and educational teaching processes.    The patient has  confirmed the participation in the programs extensive educational activities.    All questions and concerns were answered to patient's satisfaction.  The patient now wishes to proceed with surgery.    Patient has [default value] the pre-operative insertion of an IVC filter.     The patient has [default value] a postoperative course of anitcoagulant therapy.      Plan/Discussion/Summary:        I instructed patient to start on a H2 blocker or proton pump inhibitor if not already on one of these medications.    I explained in detail the procedures that we perform.  All of these procedures have a chance to convert to open if any technical challenges or complications do occur.  Bariatric surgery is not cosmetic surgery but rather a tool to help a patient make a life-long commitment lifestyle change including diet, exercise, behavior changes, and taking supplemental vitamins and minerals.    Problems after surgery may require more operations to correct them.    The risks, benefits, alternatives, and potential complications of all of the procedures were explained in detail including, but not limited to death, anesthesia and medication adverse effect, deep venous thrombosis, pulmonary embolism, trocar site/incisional hernia, wound infection, abdominal infection, bleeding, failure to lose weight, gain weight, a change in body image, metabolic complications with vitamin deficiences and anemia.    Weight loss expectations were discussed with the patient in detail. The weight loss operations most commonly performed are the sleeve gastrectomy and the Briseida-en-Y gastric bypass. These operations result in weight losses up to approximately 25-35% of initial body weight 12 to 24 months after surgery with the gastric bypass usually the higher percent of weight loss but depends on patient using the tool.    For the gastric bypass and loop duodenal switch (FILOMENA-S) the risks include but not limited to the following early complications:   Anastomotic leak/peritonitis, Briseida/Alimentary/biliopancreatic limb obstruction, severe & minor wound infection/seroma, and nausea/vomiting.  Late complications can include but are not limited to malnutrition, vitamin deficiencies, frequent loose stools,  stomal stenosis, marginal ulcer, bowel obstruction, intussusception, internal, and incisional hernia.    Regarding the gastric sleeve, there is less long-term outcome data and higher risk of dysphagia and reflux compared to a gastric bypass, as well as risk of internal visceral/organ injury, splenectomy, bleeding, infection, leak (which could require further intervention possible conversion to Briseida-en-Y gastric bypass), stenosis and possibility of regaining weight.    Juvenal was counseled regarding diagnostic results, instructions for management, risk factor reductions, prognosis, patient and family education, impressions, risks and benefits of treatment options and importance of compliance with treatment. Total face to face time of the encounter was over 45 minutes and over 30 minutes was spent counseling.     Steven Report   As part of this patient's treatment plan I am prescribing controlled substances. The patient has been made aware of appropriate use of such medications, including potential risk of somnolence, limited ability to drive and /or work safely, and potential for dependence or overdose. It has also been made clear that these medications are for use by this patient only, without concomitant use of alcohol or other substances unless prescribed.    Juvenal has completed prescribing agreement detailing terms of continued prescribing of controlled substances, including monitoring STEVEN reports, urine drug screening, and pill counts if necessary. Juvenal is aware that inappropriate use will result in cessation of prescribing such medications.    STEVEN report has been reviewed      History and physical exam exhibit continued safe and appropriate use of  controlled substances.      Juvenal understands the surgical procedures and the different surgical options that are available.  He understands the lifestyle changes that are required after surgery and has agreed to follow the guidelines outlined in the weight management program.  He also expressed understanding of the risks involved and had all of male questions answered and desires to proceed.      Cecelia Lassiter MD  4/11/2025

## 2025-04-12 LAB
COTININE UR QL SCN: NEGATIVE NG/ML
SERVICE CMNT-IMP: NORMAL

## 2025-04-14 ENCOUNTER — TELEPHONE (OUTPATIENT)
Dept: BARIATRICS/WEIGHT MGMT | Facility: CLINIC | Age: 25
End: 2025-04-14
Payer: COMMERCIAL

## 2025-04-14 ENCOUNTER — PREP FOR SURGERY (OUTPATIENT)
Dept: OTHER | Facility: HOSPITAL | Age: 25
End: 2025-04-14
Payer: COMMERCIAL

## 2025-04-14 NOTE — TELEPHONE ENCOUNTER
LMVM for patient to call back to discuss potential surgery date, schedule for pre op class, prior to submitting to insurance for PA

## 2025-04-18 DIAGNOSIS — E55.9 VITAMIN D DEFICIENCY: ICD-10-CM

## 2025-04-18 RX ORDER — ERGOCALCIFEROL 1.25 MG/1
50000 CAPSULE, LIQUID FILLED ORAL WEEKLY
Qty: 12 CAPSULE | Refills: 1 | Status: SHIPPED | OUTPATIENT
Start: 2025-04-18

## 2025-04-29 ENCOUNTER — OFFICE VISIT (OUTPATIENT)
Dept: BARIATRICS/WEIGHT MGMT | Facility: CLINIC | Age: 25
End: 2025-04-29
Payer: COMMERCIAL

## 2025-04-29 VITALS — HEIGHT: 71 IN | BODY MASS INDEX: 44.1 KG/M2 | WEIGHT: 315 LBS

## 2025-04-29 DIAGNOSIS — Z71.89 ENCOUNTER FOR PRE-BARIATRIC SURGERY COUNSELING AND EDUCATION: Primary | ICD-10-CM

## 2025-05-09 ENCOUNTER — HOSPITAL ENCOUNTER (OUTPATIENT)
Dept: CARDIOLOGY | Facility: HOSPITAL | Age: 25
Discharge: HOME | End: 2025-05-09
Payer: COMMERCIAL

## 2025-05-09 ENCOUNTER — LAB (OUTPATIENT)
Dept: LAB | Facility: HOSPITAL | Age: 25
End: 2025-05-09
Payer: COMMERCIAL

## 2025-05-09 DIAGNOSIS — E66.813 CLASS 3 OBESITY: ICD-10-CM

## 2025-05-09 LAB
ABO GROUP BLD: NORMAL
ANION GAP SERPL CALCULATED.3IONS-SCNC: 11.5 MMOL/L (ref 5–15)
BASOPHILS # BLD AUTO: 0.03 10*3/MM3 (ref 0–0.2)
BASOPHILS NFR BLD AUTO: 0.3 % (ref 0–1.5)
BLD GP AB SCN SERPL QL: NEGATIVE
BUN SERPL-MCNC: 15 MG/DL (ref 6–20)
BUN/CREAT SERPL: 24.2 (ref 7–25)
CALCIUM SPEC-SCNC: 9.5 MG/DL (ref 8.6–10.5)
CHLORIDE SERPL-SCNC: 104 MMOL/L (ref 98–107)
CO2 SERPL-SCNC: 23.5 MMOL/L (ref 22–29)
CREAT SERPL-MCNC: 0.62 MG/DL (ref 0.76–1.27)
DEPRECATED RDW RBC AUTO: 40.9 FL (ref 37–54)
EGFRCR SERPLBLD CKD-EPI 2021: 136 ML/MIN/1.73
EOSINOPHIL # BLD AUTO: 0.07 10*3/MM3 (ref 0–0.4)
EOSINOPHIL NFR BLD AUTO: 0.7 % (ref 0.3–6.2)
ERYTHROCYTE [DISTWIDTH] IN BLOOD BY AUTOMATED COUNT: 13.2 % (ref 12.3–15.4)
GLUCOSE SERPL-MCNC: 102 MG/DL (ref 65–99)
HBA1C MFR BLD: 5.58 % (ref 4.8–5.6)
HCT VFR BLD AUTO: 45.3 % (ref 37.5–51)
HGB BLD-MCNC: 14.3 G/DL (ref 13–17.7)
IMM GRANULOCYTES # BLD AUTO: 0.06 10*3/MM3 (ref 0–0.05)
IMM GRANULOCYTES NFR BLD AUTO: 0.6 % (ref 0–0.5)
LYMPHOCYTES # BLD AUTO: 1.97 10*3/MM3 (ref 0.7–3.1)
LYMPHOCYTES NFR BLD AUTO: 18.7 % (ref 19.6–45.3)
MCH RBC QN AUTO: 27.2 PG (ref 26.6–33)
MCHC RBC AUTO-ENTMCNC: 31.6 G/DL (ref 31.5–35.7)
MCV RBC AUTO: 86.1 FL (ref 79–97)
MONOCYTES # BLD AUTO: 0.71 10*3/MM3 (ref 0.1–0.9)
MONOCYTES NFR BLD AUTO: 6.7 % (ref 5–12)
NEUTROPHILS NFR BLD AUTO: 7.7 10*3/MM3 (ref 1.7–7)
NEUTROPHILS NFR BLD AUTO: 73 % (ref 42.7–76)
NRBC BLD AUTO-RTO: 0 /100 WBC (ref 0–0.2)
PLATELET # BLD AUTO: 353 10*3/MM3 (ref 140–450)
PMV BLD AUTO: 8.8 FL (ref 6–12)
POTASSIUM SERPL-SCNC: 4.1 MMOL/L (ref 3.5–5.2)
RBC # BLD AUTO: 5.26 10*6/MM3 (ref 4.14–5.8)
RH BLD: POSITIVE
SODIUM SERPL-SCNC: 139 MMOL/L (ref 136–145)
T&S EXPIRATION DATE: NORMAL
WBC NRBC COR # BLD AUTO: 10.54 10*3/MM3 (ref 3.4–10.8)

## 2025-05-09 PROCEDURE — 83036 HEMOGLOBIN GLYCOSYLATED A1C: CPT | Performed by: SURGERY

## 2025-05-09 PROCEDURE — 80048 BASIC METABOLIC PNL TOTAL CA: CPT | Performed by: SURGERY

## 2025-05-09 PROCEDURE — 86900 BLOOD TYPING SEROLOGIC ABO: CPT

## 2025-05-09 PROCEDURE — 36415 COLL VENOUS BLD VENIPUNCTURE: CPT | Performed by: SURGERY

## 2025-05-09 PROCEDURE — 86850 RBC ANTIBODY SCREEN: CPT

## 2025-05-09 PROCEDURE — 85025 COMPLETE CBC W/AUTO DIFF WBC: CPT | Performed by: SURGERY

## 2025-05-09 PROCEDURE — 86901 BLOOD TYPING SEROLOGIC RH(D): CPT

## 2025-05-09 PROCEDURE — 93005 ELECTROCARDIOGRAM TRACING: CPT | Performed by: SURGERY

## 2025-05-12 LAB
QT INTERVAL: 322 MS
QTC INTERVAL: 410 MS

## 2025-05-20 ENCOUNTER — ANESTHESIA EVENT (OUTPATIENT)
Dept: PERIOP | Facility: HOSPITAL | Age: 25
End: 2025-05-20
Payer: COMMERCIAL

## 2025-05-20 NOTE — ANESTHESIA PREPROCEDURE EVALUATION
Anesthesia Evaluation     Patient summary reviewed and Nursing notes reviewed   no history of anesthetic complications:   NPO Solid Status: > 8 hours  NPO Liquid Status: > 2 hours           Airway   Dental      Pulmonary    Cardiovascular     ECG reviewed        Neuro/Psych  GI/Hepatic/Renal/Endo    (+) morbid obesity    Musculoskeletal     Abdominal    Substance History      OB/GYN          Other        ROS/Med Hx Other: Additional History:      PSH:  WISDOM TOOTH EXTRACTION ENDOSCOPY              Anesthesia Plan    ASA 4     general and ERAS Protocol   total IV anesthesia  (Patient identified; pre-operative vital signs, all relevant labs/studies, complete medical/surgical/anesthetic history, full medication list, full allergy list, and NPO status obtained/reviewed; physical assessment performed; anesthetic options, side effects, potential complications, risks, and benefits discussed; questions answered; verbal and/or written anesthesia consent obtained; patient cleared for procedure; anesthesia machine and equipment checked and functioning)  intravenous induction     Anesthetic plan, risks, benefits, and alternatives have been provided, discussed and informed consent has been obtained with: patient.    Use of blood products discussed with  Consented to blood products.    Plan discussed with CRNA and CAA.    CODE STATUS:

## 2025-05-21 ENCOUNTER — ANESTHESIA (OUTPATIENT)
Dept: PERIOP | Facility: HOSPITAL | Age: 25
End: 2025-05-21
Payer: COMMERCIAL

## 2025-05-21 ENCOUNTER — HOSPITAL ENCOUNTER (OUTPATIENT)
Facility: HOSPITAL | Age: 25
LOS: 1 days | Discharge: HOME | End: 2025-05-22
Attending: SURGERY | Admitting: SURGERY
Payer: COMMERCIAL

## 2025-05-21 DIAGNOSIS — E66.813 CLASS 3 OBESITY: ICD-10-CM

## 2025-05-21 DIAGNOSIS — G89.18 POST-OP PAIN: Primary | ICD-10-CM

## 2025-05-21 LAB
ALBUMIN SERPL-MCNC: 4.4 G/DL (ref 3.5–5.2)
ALBUMIN/GLOB SERPL: 1.4 G/DL
ALP SERPL-CCNC: 108 U/L (ref 39–117)
ALT SERPL W P-5'-P-CCNC: 61 U/L (ref 1–41)
ANION GAP SERPL CALCULATED.3IONS-SCNC: 13.5 MMOL/L (ref 5–15)
AST SERPL-CCNC: 35 U/L (ref 1–40)
BASOPHILS # BLD AUTO: 0.01 10*3/MM3 (ref 0–0.2)
BASOPHILS NFR BLD AUTO: 0.1 % (ref 0–1.5)
BILIRUB SERPL-MCNC: 0.4 MG/DL (ref 0–1.2)
BUN SERPL-MCNC: 13 MG/DL (ref 6–20)
BUN/CREAT SERPL: 19.1 (ref 7–25)
CALCIUM SPEC-SCNC: 9.5 MG/DL (ref 8.6–10.5)
CHLORIDE SERPL-SCNC: 102 MMOL/L (ref 98–107)
CO2 SERPL-SCNC: 22.5 MMOL/L (ref 22–29)
CREAT SERPL-MCNC: 0.68 MG/DL (ref 0.76–1.27)
DEPRECATED RDW RBC AUTO: 42.4 FL (ref 37–54)
EGFRCR SERPLBLD CKD-EPI 2021: 132.3 ML/MIN/1.73
EOSINOPHIL # BLD AUTO: 0 10*3/MM3 (ref 0–0.4)
EOSINOPHIL NFR BLD AUTO: 0 % (ref 0.3–6.2)
ERYTHROCYTE [DISTWIDTH] IN BLOOD BY AUTOMATED COUNT: 13.3 % (ref 12.3–15.4)
GLOBULIN UR ELPH-MCNC: 3.2 GM/DL
GLUCOSE SERPL-MCNC: 139 MG/DL (ref 65–99)
HCT VFR BLD AUTO: 47 % (ref 37.5–51)
HGB BLD-MCNC: 15.1 G/DL (ref 13–17.7)
IMM GRANULOCYTES # BLD AUTO: 0.02 10*3/MM3 (ref 0–0.05)
IMM GRANULOCYTES NFR BLD AUTO: 0.2 % (ref 0–0.5)
LYMPHOCYTES # BLD AUTO: 0.36 10*3/MM3 (ref 0.7–3.1)
LYMPHOCYTES NFR BLD AUTO: 4.2 % (ref 19.6–45.3)
MAGNESIUM SERPL-MCNC: 2.3 MG/DL (ref 1.6–2.6)
MCH RBC QN AUTO: 28 PG (ref 26.6–33)
MCHC RBC AUTO-ENTMCNC: 32.1 G/DL (ref 31.5–35.7)
MCV RBC AUTO: 87 FL (ref 79–97)
MONOCYTES # BLD AUTO: 0.21 10*3/MM3 (ref 0.1–0.9)
MONOCYTES NFR BLD AUTO: 2.4 % (ref 5–12)
NEUTROPHILS NFR BLD AUTO: 8.07 10*3/MM3 (ref 1.7–7)
NEUTROPHILS NFR BLD AUTO: 93.1 % (ref 42.7–76)
NRBC BLD AUTO-RTO: 0 /100 WBC (ref 0–0.2)
PHOSPHATE SERPL-MCNC: 3.7 MG/DL (ref 2.5–4.5)
PLATELET # BLD AUTO: 334 10*3/MM3 (ref 140–450)
PMV BLD AUTO: 9.2 FL (ref 6–12)
POTASSIUM SERPL-SCNC: 5 MMOL/L (ref 3.5–5.2)
PROT SERPL-MCNC: 7.6 G/DL (ref 6–8.5)
RBC # BLD AUTO: 5.4 10*6/MM3 (ref 4.14–5.8)
SODIUM SERPL-SCNC: 138 MMOL/L (ref 136–145)
WBC NRBC COR # BLD AUTO: 8.67 10*3/MM3 (ref 3.4–10.8)

## 2025-05-21 PROCEDURE — 25010000002 HYDROMORPHONE 1 MG/ML SOLUTION: Performed by: REGISTERED NURSE

## 2025-05-21 PROCEDURE — 84100 ASSAY OF PHOSPHORUS: CPT | Performed by: SURGERY

## 2025-05-21 PROCEDURE — 25010000002 EPINEPHRINE 1 MG/ML SOLUTION: Performed by: SURGERY

## 2025-05-21 PROCEDURE — 25010000002 FENTANYL CITRATE (PF) 100 MCG/2ML SOLUTION: Performed by: REGISTERED NURSE

## 2025-05-21 PROCEDURE — 25010000002 ROPIVACAINE PER 1 MG: Performed by: SURGERY

## 2025-05-21 PROCEDURE — 25010000002 MAGNESIUM SULFATE PER 500 MG OF MAGNESIUM: Performed by: REGISTERED NURSE

## 2025-05-21 PROCEDURE — 25010000002 KETOROLAC TROMETHAMINE PER 15 MG: Performed by: SURGERY

## 2025-05-21 PROCEDURE — 85025 COMPLETE CBC W/AUTO DIFF WBC: CPT | Performed by: SURGERY

## 2025-05-21 PROCEDURE — 25010000002 CLONIDINE PER 1 MG: Performed by: SURGERY

## 2025-05-21 PROCEDURE — 25010000002 LIDOCAINE PF 2% 2 % SOLUTION: Performed by: REGISTERED NURSE

## 2025-05-21 PROCEDURE — 25010000002 DEXAMETHASONE PER 1 MG: Performed by: REGISTERED NURSE

## 2025-05-21 PROCEDURE — 25010000002 FAMOTIDINE 10 MG/ML SOLUTION: Performed by: SURGERY

## 2025-05-21 PROCEDURE — 83735 ASSAY OF MAGNESIUM: CPT | Performed by: SURGERY

## 2025-05-21 PROCEDURE — 25010000002 ONDANSETRON PER 1 MG: Performed by: REGISTERED NURSE

## 2025-05-21 PROCEDURE — 25810000003 LACTATED RINGERS PER 1000 ML: Performed by: SURGERY

## 2025-05-21 PROCEDURE — 25010000002 SUGAMMADEX 200 MG/2ML SOLUTION: Performed by: REGISTERED NURSE

## 2025-05-21 PROCEDURE — 25010000002 CEFAZOLIN 3 G RECONSTITUTED SOLUTION 1 EACH VIAL: Performed by: SURGERY

## 2025-05-21 PROCEDURE — 25010000002 PROPOFOL 1000 MG/100ML EMULSION: Performed by: REGISTERED NURSE

## 2025-05-21 PROCEDURE — 80053 COMPREHEN METABOLIC PANEL: CPT | Performed by: SURGERY

## 2025-05-21 PROCEDURE — 88307 TISSUE EXAM BY PATHOLOGIST: CPT | Performed by: SURGERY

## 2025-05-21 PROCEDURE — 25010000002 ESMOLOL 100 MG/10ML SOLUTION: Performed by: REGISTERED NURSE

## 2025-05-21 PROCEDURE — 25010000002 FENTANYL CITRATE (PF) 50 MCG/ML SOLUTION: Performed by: REGISTERED NURSE

## 2025-05-21 PROCEDURE — 25810000003 LACTATED RINGERS PER 1000 ML: Performed by: ANESTHESIOLOGY

## 2025-05-21 PROCEDURE — 25010000002 ACETAMINOPHEN 10 MG/ML SOLUTION: Performed by: SURGERY

## 2025-05-21 DEVICE — STAPLER 60 RELOAD BLUE
Type: IMPLANTABLE DEVICE | Site: ABDOMEN | Status: FUNCTIONAL
Brand: SUREFORM

## 2025-05-21 DEVICE — STAPLER 60 RELOAD WHITE
Type: IMPLANTABLE DEVICE | Site: ABDOMEN | Status: FUNCTIONAL
Brand: SUREFORM

## 2025-05-21 RX ORDER — ACETAMINOPHEN 500 MG
1000 TABLET ORAL EVERY 6 HOURS
Status: DISCONTINUED | OUTPATIENT
Start: 2025-05-21 | End: 2025-05-22 | Stop reason: HOSPADM

## 2025-05-21 RX ORDER — EPHEDRINE SULFATE 5 MG/ML
5 INJECTION INTRAVENOUS ONCE AS NEEDED
Status: DISCONTINUED | OUTPATIENT
Start: 2025-05-21 | End: 2025-05-21 | Stop reason: HOSPADM

## 2025-05-21 RX ORDER — OXYCODONE HYDROCHLORIDE 5 MG/1
10 TABLET ORAL EVERY 4 HOURS PRN
Status: DISCONTINUED | OUTPATIENT
Start: 2025-05-21 | End: 2025-05-21 | Stop reason: HOSPADM

## 2025-05-21 RX ORDER — SODIUM CHLORIDE 0.9 % (FLUSH) 0.9 %
10 SYRINGE (ML) INJECTION AS NEEDED
Status: DISCONTINUED | OUTPATIENT
Start: 2025-05-21 | End: 2025-05-21 | Stop reason: HOSPADM

## 2025-05-21 RX ORDER — SODIUM CHLORIDE, SODIUM LACTATE, POTASSIUM CHLORIDE, CALCIUM CHLORIDE 600; 310; 30; 20 MG/100ML; MG/100ML; MG/100ML; MG/100ML
100 INJECTION, SOLUTION INTRAVENOUS CONTINUOUS
Status: DISCONTINUED | OUTPATIENT
Start: 2025-05-21 | End: 2025-05-22 | Stop reason: HOSPADM

## 2025-05-21 RX ORDER — ONDANSETRON 2 MG/ML
4 INJECTION INTRAMUSCULAR; INTRAVENOUS ONCE AS NEEDED
Status: DISCONTINUED | OUTPATIENT
Start: 2025-05-21 | End: 2025-05-21 | Stop reason: HOSPADM

## 2025-05-21 RX ORDER — NALOXONE HCL 0.4 MG/ML
0.1 VIAL (ML) INJECTION
Status: DISCONTINUED | OUTPATIENT
Start: 2025-05-21 | End: 2025-05-22 | Stop reason: HOSPADM

## 2025-05-21 RX ORDER — METOCLOPRAMIDE 10 MG/1
10 TABLET ORAL EVERY 6 HOURS PRN
Status: DISCONTINUED | OUTPATIENT
Start: 2025-05-21 | End: 2025-05-22 | Stop reason: HOSPADM

## 2025-05-21 RX ORDER — HYDRALAZINE HYDROCHLORIDE 20 MG/ML
20-30 INJECTION INTRAMUSCULAR; INTRAVENOUS
Status: DISCONTINUED | OUTPATIENT
Start: 2025-05-21 | End: 2025-05-22 | Stop reason: HOSPADM

## 2025-05-21 RX ORDER — SCOPOLAMINE 1 MG/3D
1 PATCH, EXTENDED RELEASE TRANSDERMAL ONCE
Status: DISCONTINUED | OUTPATIENT
Start: 2025-05-21 | End: 2025-05-22 | Stop reason: HOSPADM

## 2025-05-21 RX ORDER — CHLORHEXIDINE GLUCONATE ORAL RINSE 1.2 MG/ML
15 SOLUTION DENTAL SEE ADMIN INSTRUCTIONS
Status: COMPLETED | OUTPATIENT
Start: 2025-05-21 | End: 2025-05-21

## 2025-05-21 RX ORDER — SODIUM CHLORIDE, SODIUM LACTATE, POTASSIUM CHLORIDE, CALCIUM CHLORIDE 600; 310; 30; 20 MG/100ML; MG/100ML; MG/100ML; MG/100ML
100 INJECTION, SOLUTION INTRAVENOUS CONTINUOUS
Status: DISPENSED | OUTPATIENT
Start: 2025-05-21 | End: 2025-05-22

## 2025-05-21 RX ORDER — DIPHENHYDRAMINE HYDROCHLORIDE 50 MG/ML
12.5 INJECTION, SOLUTION INTRAMUSCULAR; INTRAVENOUS
Status: DISCONTINUED | OUTPATIENT
Start: 2025-05-21 | End: 2025-05-21 | Stop reason: HOSPADM

## 2025-05-21 RX ORDER — CYANOCOBALAMIN 1000 UG/ML
1000 INJECTION, SOLUTION INTRAMUSCULAR; SUBCUTANEOUS ONCE
Status: COMPLETED | OUTPATIENT
Start: 2025-05-22 | End: 2025-05-22

## 2025-05-21 RX ORDER — OXYCODONE HYDROCHLORIDE 5 MG/1
5 TABLET ORAL ONCE AS NEEDED
Status: DISCONTINUED | OUTPATIENT
Start: 2025-05-21 | End: 2025-05-21 | Stop reason: HOSPADM

## 2025-05-21 RX ORDER — SCOPOLAMINE 1 MG/3D
1 PATCH, EXTENDED RELEASE TRANSDERMAL CONTINUOUS
Status: DISCONTINUED | OUTPATIENT
Start: 2025-05-21 | End: 2025-05-22 | Stop reason: HOSPADM

## 2025-05-21 RX ORDER — LIDOCAINE HYDROCHLORIDE 20 MG/ML
INJECTION, SOLUTION EPIDURAL; INFILTRATION; INTRACAUDAL; PERINEURAL AS NEEDED
Status: DISCONTINUED | OUTPATIENT
Start: 2025-05-21 | End: 2025-05-21 | Stop reason: SURG

## 2025-05-21 RX ORDER — SODIUM CHLORIDE 0.9 % (FLUSH) 0.9 %
10 SYRINGE (ML) INJECTION EVERY 12 HOURS SCHEDULED
Status: DISCONTINUED | OUTPATIENT
Start: 2025-05-21 | End: 2025-05-21 | Stop reason: HOSPADM

## 2025-05-21 RX ORDER — HYDRALAZINE HYDROCHLORIDE 20 MG/ML
5 INJECTION INTRAMUSCULAR; INTRAVENOUS
Status: DISCONTINUED | OUTPATIENT
Start: 2025-05-21 | End: 2025-05-21 | Stop reason: HOSPADM

## 2025-05-21 RX ORDER — THIAMINE HYDROCHLORIDE 100 MG/ML
100 INJECTION, SOLUTION INTRAMUSCULAR; INTRAVENOUS ONCE
Status: DISCONTINUED | OUTPATIENT
Start: 2025-05-21 | End: 2025-05-21 | Stop reason: HOSPADM

## 2025-05-21 RX ORDER — MEPERIDINE HYDROCHLORIDE 25 MG/ML
12.5 INJECTION INTRAMUSCULAR; INTRAVENOUS; SUBCUTANEOUS
Status: DISCONTINUED | OUTPATIENT
Start: 2025-05-21 | End: 2025-05-21 | Stop reason: HOSPADM

## 2025-05-21 RX ORDER — FENTANYL CITRATE 50 UG/ML
INJECTION, SOLUTION INTRAMUSCULAR; INTRAVENOUS AS NEEDED
Status: DISCONTINUED | OUTPATIENT
Start: 2025-05-21 | End: 2025-05-21 | Stop reason: SURG

## 2025-05-21 RX ORDER — ESMOLOL HYDROCHLORIDE 10 MG/ML
INJECTION INTRAVENOUS AS NEEDED
Status: DISCONTINUED | OUTPATIENT
Start: 2025-05-21 | End: 2025-05-21 | Stop reason: SURG

## 2025-05-21 RX ORDER — FAMOTIDINE 10 MG/ML
20 INJECTION, SOLUTION INTRAVENOUS EVERY 12 HOURS SCHEDULED
Status: DISCONTINUED | OUTPATIENT
Start: 2025-05-21 | End: 2025-05-22 | Stop reason: HOSPADM

## 2025-05-21 RX ORDER — HYOSCYAMINE SULFATE 0.12 MG/1
125 TABLET SUBLINGUAL EVERY 6 HOURS PRN
Status: DISCONTINUED | OUTPATIENT
Start: 2025-05-21 | End: 2025-05-22 | Stop reason: HOSPADM

## 2025-05-21 RX ORDER — MAGNESIUM SULFATE HEPTAHYDRATE 500 MG/ML
INJECTION, SOLUTION INTRAMUSCULAR; INTRAVENOUS AS NEEDED
Status: DISCONTINUED | OUTPATIENT
Start: 2025-05-21 | End: 2025-05-21 | Stop reason: SURG

## 2025-05-21 RX ORDER — PROMETHAZINE HYDROCHLORIDE 12.5 MG/1
12.5 SUPPOSITORY RECTAL EVERY 6 HOURS PRN
Status: DISCONTINUED | OUTPATIENT
Start: 2025-05-21 | End: 2025-05-22 | Stop reason: HOSPADM

## 2025-05-21 RX ORDER — KETAMINE HCL IN NACL, ISO-OSM 100MG/10ML
SYRINGE (ML) INJECTION AS NEEDED
Status: DISCONTINUED | OUTPATIENT
Start: 2025-05-21 | End: 2025-05-21 | Stop reason: SURG

## 2025-05-21 RX ORDER — NALOXONE HCL 0.4 MG/ML
0.4 VIAL (ML) INJECTION AS NEEDED
Status: DISCONTINUED | OUTPATIENT
Start: 2025-05-21 | End: 2025-05-21 | Stop reason: HOSPADM

## 2025-05-21 RX ORDER — IPRATROPIUM BROMIDE AND ALBUTEROL SULFATE 2.5; .5 MG/3ML; MG/3ML
3 SOLUTION RESPIRATORY (INHALATION) ONCE AS NEEDED
Status: DISCONTINUED | OUTPATIENT
Start: 2025-05-21 | End: 2025-05-21 | Stop reason: HOSPADM

## 2025-05-21 RX ORDER — ACETAMINOPHEN 160 MG/5ML
1000 SOLUTION ORAL EVERY 6 HOURS
Status: DISCONTINUED | OUTPATIENT
Start: 2025-05-21 | End: 2025-05-22 | Stop reason: HOSPADM

## 2025-05-21 RX ORDER — ACETAMINOPHEN 10 MG/ML
1000 INJECTION, SOLUTION INTRAVENOUS ONCE
Status: COMPLETED | OUTPATIENT
Start: 2025-05-21 | End: 2025-05-21

## 2025-05-21 RX ORDER — SODIUM CHLORIDE 0.9 % (FLUSH) 0.9 %
3 SYRINGE (ML) INJECTION EVERY 12 HOURS SCHEDULED
Status: DISCONTINUED | OUTPATIENT
Start: 2025-05-21 | End: 2025-05-22 | Stop reason: HOSPADM

## 2025-05-21 RX ORDER — LABETALOL HYDROCHLORIDE 5 MG/ML
5 INJECTION, SOLUTION INTRAVENOUS
Status: DISCONTINUED | OUTPATIENT
Start: 2025-05-21 | End: 2025-05-21 | Stop reason: HOSPADM

## 2025-05-21 RX ORDER — ONDANSETRON 4 MG/1
4 TABLET, ORALLY DISINTEGRATING ORAL EVERY 6 HOURS PRN
Status: DISCONTINUED | OUTPATIENT
Start: 2025-05-21 | End: 2025-05-22 | Stop reason: HOSPADM

## 2025-05-21 RX ORDER — PROMETHAZINE HYDROCHLORIDE 12.5 MG/1
12.5 TABLET ORAL EVERY 6 HOURS PRN
Status: DISCONTINUED | OUTPATIENT
Start: 2025-05-21 | End: 2025-05-22 | Stop reason: HOSPADM

## 2025-05-21 RX ORDER — DEXAMETHASONE SODIUM PHOSPHATE 4 MG/ML
INJECTION, SOLUTION INTRA-ARTICULAR; INTRALESIONAL; INTRAMUSCULAR; INTRAVENOUS; SOFT TISSUE AS NEEDED
Status: DISCONTINUED | OUTPATIENT
Start: 2025-05-21 | End: 2025-05-21 | Stop reason: SURG

## 2025-05-21 RX ORDER — HYDROMORPHONE HYDROCHLORIDE 2 MG/1
2 TABLET ORAL EVERY 4 HOURS PRN
Status: DISCONTINUED | OUTPATIENT
Start: 2025-05-21 | End: 2025-05-22 | Stop reason: HOSPADM

## 2025-05-21 RX ORDER — SODIUM CHLORIDE, SODIUM LACTATE, POTASSIUM CHLORIDE, CALCIUM CHLORIDE 600; 310; 30; 20 MG/100ML; MG/100ML; MG/100ML; MG/100ML
9 INJECTION, SOLUTION INTRAVENOUS CONTINUOUS PRN
Status: DISCONTINUED | OUTPATIENT
Start: 2025-05-21 | End: 2025-05-21 | Stop reason: HOSPADM

## 2025-05-21 RX ORDER — ONDANSETRON 2 MG/ML
8 INJECTION INTRAMUSCULAR; INTRAVENOUS EVERY 6 HOURS PRN
Status: DISCONTINUED | OUTPATIENT
Start: 2025-05-21 | End: 2025-05-22 | Stop reason: HOSPADM

## 2025-05-21 RX ORDER — ONDANSETRON 2 MG/ML
INJECTION INTRAMUSCULAR; INTRAVENOUS AS NEEDED
Status: DISCONTINUED | OUTPATIENT
Start: 2025-05-21 | End: 2025-05-21 | Stop reason: SURG

## 2025-05-21 RX ORDER — FENTANYL CITRATE 50 UG/ML
50 INJECTION, SOLUTION INTRAMUSCULAR; INTRAVENOUS
Status: DISCONTINUED | OUTPATIENT
Start: 2025-05-21 | End: 2025-05-21 | Stop reason: HOSPADM

## 2025-05-21 RX ORDER — ROCURONIUM BROMIDE 10 MG/ML
INJECTION, SOLUTION INTRAVENOUS AS NEEDED
Status: DISCONTINUED | OUTPATIENT
Start: 2025-05-21 | End: 2025-05-21 | Stop reason: SURG

## 2025-05-21 RX ORDER — PROPOFOL 10 MG/ML
INJECTION, EMULSION INTRAVENOUS AS NEEDED
Status: DISCONTINUED | OUTPATIENT
Start: 2025-05-21 | End: 2025-05-21 | Stop reason: SURG

## 2025-05-21 RX ORDER — SODIUM CHLORIDE 0.9 % (FLUSH) 0.9 %
3-10 SYRINGE (ML) INJECTION AS NEEDED
Status: DISCONTINUED | OUTPATIENT
Start: 2025-05-21 | End: 2025-05-21 | Stop reason: HOSPADM

## 2025-05-21 RX ORDER — METOCLOPRAMIDE HYDROCHLORIDE 5 MG/ML
10 INJECTION INTRAMUSCULAR; INTRAVENOUS EVERY 6 HOURS PRN
Status: DISCONTINUED | OUTPATIENT
Start: 2025-05-21 | End: 2025-05-22 | Stop reason: HOSPADM

## 2025-05-21 RX ORDER — PANTOPRAZOLE SODIUM 40 MG/10ML
40 INJECTION, POWDER, LYOPHILIZED, FOR SOLUTION INTRAVENOUS ONCE
Status: COMPLETED | OUTPATIENT
Start: 2025-05-21 | End: 2025-05-21

## 2025-05-21 RX ORDER — DIPHENHYDRAMINE HYDROCHLORIDE 50 MG/ML
12.5 INJECTION, SOLUTION INTRAMUSCULAR; INTRAVENOUS ONCE AS NEEDED
Status: DISCONTINUED | OUTPATIENT
Start: 2025-05-21 | End: 2025-05-21 | Stop reason: HOSPADM

## 2025-05-21 RX ADMIN — SODIUM CHLORIDE, POTASSIUM CHLORIDE, SODIUM LACTATE AND CALCIUM CHLORIDE 100 ML/HR: 600; 310; 30; 20 INJECTION, SOLUTION INTRAVENOUS at 11:29

## 2025-05-21 RX ADMIN — ACETAMINOPHEN 1000 MG: 500 TABLET, FILM COATED ORAL at 12:45

## 2025-05-21 RX ADMIN — ONDANSETRON 4 MG: 2 INJECTION, SOLUTION INTRAMUSCULAR; INTRAVENOUS at 07:45

## 2025-05-21 RX ADMIN — ROCURONIUM BROMIDE 10 MG: 10 INJECTION, SOLUTION INTRAVENOUS at 08:16

## 2025-05-21 RX ADMIN — LIDOCAINE HYDROCHLORIDE 100 MG: 20 INJECTION, SOLUTION EPIDURAL; INFILTRATION; INTRACAUDAL; PERINEURAL at 07:32

## 2025-05-21 RX ADMIN — ROCURONIUM BROMIDE 100 MG: 10 INJECTION, SOLUTION INTRAVENOUS at 07:32

## 2025-05-21 RX ADMIN — FAMOTIDINE 20 MG: 10 INJECTION INTRAVENOUS at 20:39

## 2025-05-21 RX ADMIN — HYDROMORPHONE HYDROCHLORIDE 0.5 MG: 1 INJECTION, SOLUTION INTRAMUSCULAR; INTRAVENOUS; SUBCUTANEOUS at 08:29

## 2025-05-21 RX ADMIN — DEXAMETHASONE SODIUM PHOSPHATE 8 MG: 4 INJECTION, SOLUTION INTRAMUSCULAR; INTRAVENOUS at 07:45

## 2025-05-21 RX ADMIN — SUGAMMADEX 400 MG: 100 INJECTION, SOLUTION INTRAVENOUS at 08:27

## 2025-05-21 RX ADMIN — SODIUM CHLORIDE 3 G: 900 INJECTION INTRAVENOUS at 07:23

## 2025-05-21 RX ADMIN — SUGAMMADEX 200 MG: 100 INJECTION, SOLUTION INTRAVENOUS at 08:36

## 2025-05-21 RX ADMIN — PROPOFOL 200 MCG/KG/MIN: 10 INJECTION, EMULSION INTRAVENOUS at 07:33

## 2025-05-21 RX ADMIN — HYDROMORPHONE HYDROCHLORIDE 2 MG: 2 TABLET ORAL at 20:39

## 2025-05-21 RX ADMIN — PROPOFOL 200 MCG/KG/MIN: 10 INJECTION, EMULSION INTRAVENOUS at 07:50

## 2025-05-21 RX ADMIN — CHLORHEXIDINE GLUCONATE, 0.12% ORAL RINSE 15 ML: 1.2 SOLUTION DENTAL at 06:41

## 2025-05-21 RX ADMIN — ACETAMINOPHEN 1000 MG: 1000 INJECTION INTRAVENOUS at 06:40

## 2025-05-21 RX ADMIN — ROCURONIUM BROMIDE 20 MG: 10 INJECTION, SOLUTION INTRAVENOUS at 07:52

## 2025-05-21 RX ADMIN — SODIUM CHLORIDE, SODIUM LACTATE, POTASSIUM CHLORIDE, AND CALCIUM CHLORIDE 100 ML/HR: .6; .31; .03; .02 INJECTION, SOLUTION INTRAVENOUS at 06:41

## 2025-05-21 RX ADMIN — FENTANYL CITRATE 100 MCG: 50 INJECTION, SOLUTION INTRAMUSCULAR; INTRAVENOUS at 07:32

## 2025-05-21 RX ADMIN — SODIUM CHLORIDE, SODIUM LACTATE, POTASSIUM CHLORIDE, AND CALCIUM CHLORIDE: .6; .31; .03; .02 INJECTION, SOLUTION INTRAVENOUS at 08:27

## 2025-05-21 RX ADMIN — FENTANYL CITRATE 50 MCG: 50 INJECTION, SOLUTION INTRAMUSCULAR; INTRAVENOUS at 09:40

## 2025-05-21 RX ADMIN — PROPOFOL 250 MG: 10 INJECTION, EMULSION INTRAVENOUS at 07:32

## 2025-05-21 RX ADMIN — MAGNESIUM SULFATE HEPTAHYDRATE 1 G: 500 INJECTION, SOLUTION INTRAMUSCULAR; INTRAVENOUS at 07:52

## 2025-05-21 RX ADMIN — MAGNESIUM SULFATE HEPTAHYDRATE 1 G: 500 INJECTION, SOLUTION INTRAMUSCULAR; INTRAVENOUS at 08:00

## 2025-05-21 RX ADMIN — ACETAMINOPHEN 1000 MG: 500 TABLET, FILM COATED ORAL at 19:02

## 2025-05-21 RX ADMIN — HYDROMORPHONE HYDROCHLORIDE 2 MG: 2 TABLET ORAL at 16:37

## 2025-05-21 RX ADMIN — SODIUM CHLORIDE, SODIUM LACTATE, POTASSIUM CHLORIDE, AND CALCIUM CHLORIDE: .6; .31; .03; .02 INJECTION, SOLUTION INTRAVENOUS at 07:27

## 2025-05-21 RX ADMIN — SCOPOLAMINE 1 PATCH: 1.5 PATCH, EXTENDED RELEASE TRANSDERMAL at 06:40

## 2025-05-21 RX ADMIN — PROPOFOL 180 MCG/KG/MIN: 10 INJECTION, EMULSION INTRAVENOUS at 08:05

## 2025-05-21 RX ADMIN — Medication 10 MG: at 08:32

## 2025-05-21 RX ADMIN — HYOSCYAMINE SULFATE 125 MCG: 0.12 TABLET SUBLINGUAL at 10:26

## 2025-05-21 RX ADMIN — HYDROMORPHONE HYDROCHLORIDE 0.5 MG: 1 INJECTION, SOLUTION INTRAMUSCULAR; INTRAVENOUS; SUBCUTANEOUS at 10:13

## 2025-05-21 RX ADMIN — SODIUM CHLORIDE, POTASSIUM CHLORIDE, SODIUM LACTATE AND CALCIUM CHLORIDE 100 ML/HR: 600; 310; 30; 20 INJECTION, SOLUTION INTRAVENOUS at 21:06

## 2025-05-21 RX ADMIN — Medication 20 MG: at 07:40

## 2025-05-21 RX ADMIN — ESMOLOL HYDROCHLORIDE 10 MG: 100 INJECTION, SOLUTION INTRAVENOUS at 08:04

## 2025-05-21 RX ADMIN — HYDROMORPHONE HYDROCHLORIDE 0.5 MG: 1 INJECTION, SOLUTION INTRAMUSCULAR; INTRAVENOUS; SUBCUTANEOUS at 07:56

## 2025-05-21 RX ADMIN — Medication 3 ML: at 20:40

## 2025-05-21 RX ADMIN — Medication 10 MG: at 08:00

## 2025-05-21 RX ADMIN — HYDROMORPHONE HYDROCHLORIDE 0.5 MG: 1 INJECTION, SOLUTION INTRAMUSCULAR; INTRAVENOUS; SUBCUTANEOUS at 09:59

## 2025-05-21 RX ADMIN — PANTOPRAZOLE SODIUM 40 MG: 40 INJECTION, POWDER, FOR SOLUTION INTRAVENOUS at 06:41

## 2025-05-21 RX ADMIN — FENTANYL CITRATE 50 MCG: 50 INJECTION, SOLUTION INTRAMUSCULAR; INTRAVENOUS at 09:20

## 2025-05-21 RX ADMIN — FAMOTIDINE 20 MG: 10 INJECTION INTRAVENOUS at 12:45

## 2025-05-21 NOTE — ANESTHESIA PROCEDURE NOTES
Airway  Reason: elective    Date/Time: 5/21/2025 7:33 AM    General Information and Staff    Patient location during procedure: OR  CRNA/CAA: Karli Rizzo CRNA    Indications and Patient Condition  Indications for airway management: airway protection    Preoxygenated: yes  MILS not maintained throughout    Mask difficulty assessment: 1 - vent by mask    Final Airway Details    Final airway type: endotracheal airway      Successful airway: ETT   Successful intubation technique: video laryngoscopy  Adjuncts used in placement: intubating stylet  Blade size: 3  ETT size (mm): 7.5  Cormack-Lehane Classification: grade I - full view of glottis  Placement verified by: chest auscultation and capnometry   Measured from: teeth  ETT/EBT  to teeth (cm): 22  Number of attempts at approach: 1  Assessment: lips, teeth, and gum same as pre-op and atraumatic intubation

## 2025-05-21 NOTE — OP NOTE
PREOPERATIVE DIAGNOSIS:  Class 3with multiple comorbidities as referenced in the most recent history and physical. Body mass index is 54.68 kg/m².      POSTOPERATIVE DIAGNOSIS:  Class 3 with multiple comorbidities as referenced in the most recent history and physical.Body mass index is 54.68 kg/m².      PROCEDURES PERFORMED:  1.  Robotic assisted laparoscopic sleeve gastrectomy   SURGEON:  Cecelia Lassiter MD FACS, FAMBS    ASSISTANT:  Assistant: Keisha Sun APRN    Surgery assisted and facilitated by a certified assistant, who directly resulted in a decreased operative time, anesthetic time, wound exposure, and possibly of an operative wound infection, thereby decreasing patient morbidity and ultimately total expenditures.  The surgical assistant assisted in placement of trochars, take down of the gastrocolic omentum, short gastric vessels and dissection at the angle of His.  Also assisted in retraction of the stomach during stapling so as not to kink the gastric sleeve.  Also assisted in removing of the gastric specimen, closure of the fascial defect as well as closure of the skin incisions.    ANESTHESIA:  General endotracheal.    ESTIMATED BLOOD LOSS:   Less than 25 mL unless dictated below.    FLUIDS:  Crystalloids.    SPECIMENS:  Gastric remnant    DRAINS:  None.    Implant Name Type Inv. Item Serial No.  Lot No. LRB No. Used Action   RELOAD STPLR TISS SUREFORM/60 DAVINCI/X/XI 6ROW 2.5 WHT 1P/U - [id number] Implant RELOAD STPLR TISS SUREFORM/60 DAVINCI/X/XI 6ROW 2.5 WHT 1P/U  [employer] [id number] N/A 3 Implanted   RELOAD STPLR TISS SUREFORM/60 DAVINCI/X/XI 6ROW 3.5 RHONDA 1P/U - [id number] Implant RELOAD STPLR TISS SUREFORM/60 DAVINCI/X/XI 6ROW 3.5 RHNODA 1P/U  INTUITIVE SURGICAL [id number] N/A 1 Implanted       COUNTS:  Correct.    COMPLICATIONS:  None.    INDICATIONS:  This patient with Class 3 obesity and associated comorbidities presents for elective laparoscopic, possible open sleeve  gastrectomy.  The patient has received medical clearance to proceed.  The patient has undergone our extensive educational process and consent process and wishes to proceed.        DESCRIPTION OF PROCEDURE:  The patient was brought to the operating room and placed supine upon the operating room table. SCD hose were placed.  The patient underwent uneventful general endotracheal anesthesia per the anesthesiology staff. The abdomen was prepped with ChloraPrep and draped in the usual sterile fashion.  An Ioban was used as well if not allergic.  Depending on the technique to size the gastric sleeve, anesthesia staff either passed a 40-Nigerien ViSiGi bougie into the stomach to decompress and then was pulled back to the esophagus.     A small incision was made in the left upper quadrant near the costal margin at Francis's point for the Veress needle.  The Veress needle was inserted into the peritoneum and insufflation commenced to 15 mmHg.       An 8 mm transverse incision was made at the left midclavicular line about 15 cm inferior to the xiphoid.  The peritoneal cavity was entered under direct camera visualization using a 5  mm 0° laparoscope and an Optiview trocar.  The abdomen was then insufflated to a pressure of 15-16 mmHg with CO2 gas.  Exploratory laparoscopy revealed no evidence of injury from the entrance technique and no significant abnormalities unless addendum dictated below.    The patient was placed in reverse Trendelenburg position.   A 12 mm trocar was placed in the right abdomen.  I then changed the scope to a 30 degree da Joel scope.  Under direct camera visualization two 8 mm trocar was placed in the left lateral midabdominal position.        Next the 30 degree 8 mm scope was brought into the 8 mm port just to the left of the umbilicus and the corresponding trocar was docked to the da Joel robot.  Targeting then took place and then the rest of the trochars were docked to the robot and angled into  position.  Instruments were brought in through the trochars in place and into view.          I then took control of the surgical console.The gastrocolic omentum was divided with the Vessel Sealer and this proceeded superiorly to the angle of His taking down the short gastric vessels.  All posterior attachments of the lesser sac and posterior aspect of the stomach to the pancreas were taken down as well.          Dissection then proceeded medially taking down the greater curvature with the vessel sealer to just proximal to the pylorus.  The 40-Estonian orogastric tube was passed back down into the stomach for decompression and later to aid in sizing the sleeve.       I then used the Sitedeske stapler and a blue load was used to create the gastric sleeve.   There were additional firings to complete the gastric sleeve near the angle of Hiss, see above.  ICG was used for a leak test by insufflating ICG into the orogastric tube and the staple line was closely inspected under firefly and there was no evidence of leak.    An omentopexy was performed of the entire staple line of the gastric sleeve using a 3-0 V-Loc 180.  The specimen was removed through the 12 mm port site.   The fascia at the 12 mm trocar site incision that was used for extraction was closed with a single 0 Vicryl suture in a figure-of-eight fashion placed under direct laparoscopic camera visualization with a suture passer and tying the knot extracorporeally.  The fascia in the area was infiltrated with local anesthesia. All incisions were then infiltrated with local anesthetic. The remaining trocars were removed under direct camera visualization with no bleeding noted from their sites.  The abdomen was desufflated of gas. The skin in each incision was closed using 4-0 antibiotic impregnated Monocryl in a subcuticular fashion followed by Dermabond.  The patient tolerated the procedure well without complication and was taken to the recovery room in stable  condition.  All sponge, needle and instrument counts were correct.

## 2025-05-21 NOTE — PLAN OF CARE
Goal Outcome Evaluation:                 Pt VSS. Pt able to make needs known. Lap sites clean, dry, and intact. Pt able to ambulate independently. LR infusing. Call light within reach. Plan of care ongoing.

## 2025-05-21 NOTE — ANESTHESIA POSTPROCEDURE EVALUATION
Patient: Juvenal Herrera    Procedure Summary       Date: 05/21/25 Room / Location: [facility] OR 08 / [facility] MAIN OR    Anesthesia Start: 0727 Anesthesia Stop: 0850    Procedure: GASTRIC SLEEVE LAPAROSCOPIC WITH DAVINCI ROBOT (Abdomen) Diagnosis:       Class 3 obesity      (Class 3 obesity [E66.813])    Surgeons: Cecelia Lassiter MD Provider: Corby Biggs MD    Anesthesia Type: general, ERAS Protocol ASA Status: 4            Anesthesia Type: general, ERAS Protocol    Vitals  Vitals Value Taken Time   /99 05/21/25 09:18   Temp 97.7 °F (36.5 °C) 05/21/25 08:45   Pulse 90 05/21/25 09:20   Resp 24 05/21/25 09:15   SpO2 98 % 05/21/25 09:20   Vitals shown include unfiled device data.        Post Anesthesia Care and Evaluation    Patient location during evaluation: PACU  Patient participation: complete - patient participated  Level of consciousness: awake  Pain scale: See nurse's notes for pain score.  Pain management: adequate    Airway patency: patent  Anesthetic complications: No anesthetic complications  PONV Status: none  Cardiovascular status: acceptable  Respiratory status: acceptable and spontaneous ventilation  Hydration status: acceptable    Comments: Patient seen and examined postoperatively; vital signs stable; SpO2 greater than or equal to 90%; cardiopulmonary status stable; nausea/vomiting adequately controlled; pain adequately controlled; no apparent anesthesia complications; patient discharged from anesthesia care when discharge criteria were met

## 2025-05-21 NOTE — H&P
Bariatric Consult:    Chief Complaint: Morbid Obesity  Referred by Jessi Rivera APRN    Juvenal Herrera is here today for consult on Morbid Obesity    History of Present Illness:     Juvenal Herrera is a 25 y.o. male with class 3 obesity with co-morbidities including  has a past medical history of Allergic (2000) and Obesity.  who presents for surgical consultation for the above procedure. Juvenal has completed the initial intake visit and has been examined by our nurse practitioner, dietician, psychologist and underwent the extensive educational teaching process under the guidance of our bariatric coordinator and myself. Juvenal also has seen the educational video FROYLAN on the surgical procedure if available. Juvenal attended today more educational teaching from our bariatric coordinator and myself. Juvenal has had an extensive medical workup including a visit with their primary care physician, EKG, chest radiograph, blood work, EGD or UGI and possibly further testing. These have been reviewed by me and discussed with the patient. Juvenal is now ready to proceed with surgery. Juvenal presently denies nausea, vomiting, fever, chills, chest pain, shortness of air, melena, hematochezia, hemetemesis, dysuria, frequency, hematuria, jaundice or abdominal pain.     Wt Readings from Last 10 Encounters:   05/21/25 (!) 183 kg (403 lb 3.2 oz)   04/29/25 (!) 193 kg (424 lb 11.2 oz)   04/11/25 (!) 190 kg (419 lb 1.6 oz)   03/31/25 (!) 196 kg (431 lb)   03/04/25 (!) 192 kg (423 lb 9.6 oz)   02/27/25 (!) 195 kg (430 lb 3.2 oz)   11/27/24 (!) 194 kg (427 lb)   01/05/24 (!) 190 kg (419 lb)   10/05/23 (!) 189 kg (416 lb 12.8 oz)   05/31/23 (!) 189 kg (417 lb)       The  pre-op EGD shows   normal    Past Medical History:   Diagnosis Date    Allergic 2000    Pennicillin and omoxycillan    Obesity        @DXC@    Past Surgical History:   Procedure Laterality Date    ENDOSCOPY N/A 3/31/2025    Procedure:  ESOPHAGOGASTRODUODENOSCOPY WITH BIOPSY X1 AREAS;  Surgeon: Cecelia Lassiter MD;  Location: [facility];  Service: General;  Laterality: N/A;  Post: NORMAL EGD    WISDOM TOOTH EXTRACTION           Class 3 obesity    Obesity, Class III, BMI 40-49.9 (morbid obesity)      Allergies   Allergen Reactions    Penicillins Hives     Beta lactam allergy details  Antibiotic reaction: rash  Age at reaction: infant  Dose to reaction time: unknown  Reason for antibiotic: unknown  Epinephrine required for reaction?: no  Tolerated antibiotics: unknown       Amoxicillin Rash    Cephalexin Rash         Current Facility-Administered Medications:     ceFAZolin 3000 mg IVPB in 100 mL NS (MBP), 3 g, Intravenous, Once, Cecelia Lassiter MD    lactated ringers infusion, 9 mL/hr, Intravenous, Continuous PRN, Corby Biggs MD    lactated ringers infusion, 100 mL/hr, Intravenous, Continuous, Cecelia Lassiter MD, Last Rate: 100 mL/hr at 05/21/25 0641, 100 mL/hr at 05/21/25 0641    scopolamine patch 1 mg/72 hr, 1 patch, Transdermal, Continuous, Cecelia Lassiter MD, 1 patch at 05/21/25 0640    sodium chloride 0.9 % flush 10 mL, 10 mL, Intravenous, Q12H, Corby Biggs MD    sodium chloride 0.9 % flush 10 mL, 10 mL, Intravenous, PRN, Corby Biggs MD    sodium chloride 0.9 % flush 3 mL, 3 mL, Intravenous, Q12H, Cecelia Lassiter MD    sodium chloride 0.9 % flush 3-10 mL, 3-10 mL, Intravenous, PRN, Cecelia Lassiter MD    Social History     Socioeconomic History    Marital status: Single   Tobacco Use    Smoking status: Never     Passive exposure: Never    Smokeless tobacco: Never   Vaping Use    Vaping status: Never Used   Substance and Sexual Activity    Alcohol use: Not Currently    Drug use: Yes     Types: Other     Comment: THC gummies    Sexual activity: Defer       Family History   Problem Relation Age of Onset    Other Mother     Hypertension Mother     Hyperlipidemia Father     Thyroid disease Father 52        oncocytic follicular thyroid  carcinoma       Review of Systems:  Review of Systems    Review of Systems   Constitutional: Negative.    HENT: Negative.    Eyes: Negative.    Respiratory: Negative.    Cardiovascular: Negative.    Gastrointestinal: Negative.    Endocrine: Negative.    Genitourinary: Negative.    Musculoskeletal: Negative.    Skin: Negative.    Allergic/Immunologic: Negative.    Neurological: Negative.    Hematological: Negative.    Psychiatric/Behavioral: Negative.      Physical Exam:  Body mass index is 54.68 kg/m².   Vital Signs:  Weight: (!) 183 kg (403 lb 3.2 oz)   Body mass index is 54.68 kg/m².  Temp: 97.9 °F (36.6 °C)   Heart Rate: 82   BP: 121/69     Awake and alert  Normal mental status  Normal pulmonary effort  Abdomen appropriate tenderness  Incisions no erythema  Extremities no tenderness or swelling      Assessment:  Patient Active Problem List   Diagnosis    Simple myopia    Refractive amblyopia of right eye    Myopia of both eyes    Vitamin D deficiency    Class 3 obesity    Obesity, Class III, BMI 40-49.9 (morbid obesity)         Juvenal Herrera is a 25 y.o. year old male with class 3 obesity with a BMI of Body mass index is 54.68 kg/m². and multiple co-morbidities listed in the encounter diagnosis.    I think he is an appropriate candidate for this surgery, and is ready to proceed.        Plan/Discussion/Summary:        I instructed patient to start on a H2 blocker or proton pump inhibitor if not already on one of these medications.    I explained in detail the procedures that we perform.  All of these procedures have a chance to convert to open if any technical challenges or complications do occur.  Bariatric surgery is not cosmetic surgery but rather a tool to help a patient make a life-long commitment lifestyle change including diet, exercise, behavior changes, and taking supplemental vitamins and minerals.    Problems after surgery may require more operations to correct them.    The risks, benefits,  alternatives, and potential complications of all of the procedures were explained in detail including, but not limited to death, anesthesia and medication adverse effect, deep venous thrombosis, pulmonary embolism, trocar site/incisional hernia, wound infection, abdominal infection, bleeding, failure to lose weight, gain weight, a change in body image, metabolic complications with vitamin deficiences and anemia.    Weight loss expectations were discussed with the patient in detail. The weight loss operations most commonly performed are the sleeve gastrectomy and the Briseida-en-Y gastric bypass. These operations result in weight losses up to approximately 25-35% of initial body weight 12 to 24 months after surgery with the gastric bypass usually the higher percent of weight loss but depends on patient using the tool.    For the gastric bypass and loop duodenal switch (FILOMENA-S) the risks include but not limited to the following early complications:  Anastomotic leak/peritonitis, Briseida/Alimentary/biliopancreatic limb obstruction, severe & minor wound infection/seroma, and nausea/vomiting.  Late complications can include but are not limited to malnutrition, vitamin deficiencies, frequent loose stools,  stomal stenosis, marginal ulcer, bowel obstruction, intussusception, internal, and incisional hernia.    Regarding the gastric sleeve, there is less long-term outcome data and higher risk of dysphagia and reflux compared to a gastric bypass, as well as risk of internal visceral/organ injury, splenectomy, bleeding, infection, leak (which could require further intervention possible conversion to Briseida-en-Y gastric bypass), stenosis and possibility of regaining weight.    Juvenal was counseled regarding diagnostic results, instructions for management, risk factor reductions, prognosis, patient and family education, impressions, risks and benefits of treatment options and importance of compliance with treatment. Total face to face  time of the encounter was over 45 minutes and over 30 minutes was spent counseling.     Steven Report   As part of this patient's treatment plan I am prescribing controlled substances. The patient has been made aware of appropriate use of such medications, including potential risk of somnolence, limited ability to drive and /or work safely, and potential for dependence or overdose. It has also been made clear that these medications are for use by this patient only, without concomitant use of alcohol or other substances unless prescribed.    Juvenal has completed prescribing agreement detailing terms of continued prescribing of controlled substances, including monitoring STEVEN reports, urine drug screening, and pill counts if necessary. Juvenal is aware that inappropriate use will result in cessation of prescribing such medications.    STEVEN report has been reviewed      History and physical exam exhibit continued safe and appropriate use of controlled substances.      Juvenal understands the surgical procedures and the different surgical options that are available.  He understands the lifestyle changes that are required after surgery and has agreed to follow the guidelines outlined in the weight management program.  He also expressed understanding of the risks involved and had all of male questions answered and desires to proceed.      Cecelia Lassiter MD  5/21/2025

## 2025-05-22 VITALS
RESPIRATION RATE: 16 BRPM | HEART RATE: 87 BPM | DIASTOLIC BLOOD PRESSURE: 87 MMHG | SYSTOLIC BLOOD PRESSURE: 129 MMHG | WEIGHT: 315 LBS | HEIGHT: 72 IN | BODY MASS INDEX: 42.66 KG/M2 | TEMPERATURE: 97.6 F | OXYGEN SATURATION: 98 %

## 2025-05-22 LAB
LAB AP CASE REPORT: NORMAL
PATH REPORT.FINAL DX SPEC: NORMAL
PATH REPORT.GROSS SPEC: NORMAL

## 2025-05-22 PROCEDURE — 25010000002 CYANOCOBALAMIN PER 1000 MCG: Performed by: SURGERY

## 2025-05-22 PROCEDURE — 25010000002 FAMOTIDINE 10 MG/ML SOLUTION: Performed by: SURGERY

## 2025-05-22 RX ORDER — HYDROMORPHONE HYDROCHLORIDE 2 MG/1
2 TABLET ORAL EVERY 6 HOURS PRN
Qty: 18 TABLET | Refills: 0 | Status: SHIPPED | OUTPATIENT
Start: 2025-05-22

## 2025-05-22 RX ORDER — ONDANSETRON 8 MG/1
8 TABLET, ORALLY DISINTEGRATING ORAL EVERY 8 HOURS PRN
Qty: 30 TABLET | Refills: 12 | Status: SHIPPED | OUTPATIENT
Start: 2025-05-22 | End: 2025-06-21

## 2025-05-22 RX ORDER — URSODIOL 250 MG/1
250 TABLET, FILM COATED ORAL 2 TIMES DAILY
Qty: 60 TABLET | Refills: 5 | Status: SHIPPED | OUTPATIENT
Start: 2025-05-22 | End: 2025-06-21

## 2025-05-22 RX ADMIN — ACETAMINOPHEN 1000 MG: 500 TABLET, FILM COATED ORAL at 08:09

## 2025-05-22 RX ADMIN — Medication 3 ML: at 08:09

## 2025-05-22 RX ADMIN — HYDROMORPHONE HYDROCHLORIDE 2 MG: 2 TABLET ORAL at 01:08

## 2025-05-22 RX ADMIN — ACETAMINOPHEN 1000 MG: 160 SOLUTION ORAL at 01:07

## 2025-05-22 RX ADMIN — CYANOCOBALAMIN 1000 MCG: 1000 INJECTION, SOLUTION INTRAMUSCULAR; SUBCUTANEOUS at 08:09

## 2025-05-22 RX ADMIN — FAMOTIDINE 20 MG: 10 INJECTION INTRAVENOUS at 08:09

## 2025-05-22 NOTE — DISCHARGE INSTRUCTIONS
GOING HOME AFTER GASTRIC SLEEVE/ GASTRIC BYPASS SURGERY  [facility]: Post-Operative Information/Instructions  Cecelia Lassiter MD  General Patient Instructions for Discharge   - Call Surgeon's office at [phone] for follow-up appointment.    - Be sure you, the patient, have a follow-up appointment to be seen within seven (7) days after discharge. If not, please call [phone] to schedule an appointment. If you are discharged on a Saturday or Sunday, please call Monday to schedule the appointment.  - Contact the Surgeon at [phone] for any questions or concerns, including temperature greater than or equal to 101F, shortness of breath, leg swelling, redness at incision sites, nausea, vomiting, chills, or problems or questions.      - You may shower. No tub bath for 2 weeks.  - No lifting, pushing, pulling, or tugging >25 pounds for 3 weeks.  - Ambulate every 3 hours while awake minimum for seven (7) days, increase distance daily.  - For the next several weeks, you are at an increased risk for blood clot formation. Therefore, you should walk regularly. You should not sit for prolonged periods of time, more than 45 minutes, without getting up and walking for 5-10 minutes. This includes any car rides, including the drive home from the hospital. If driving any distance greater than 30 miles over the next two (2) weeks, stop every 30-45 minutes and walk for 5-10 minutes each time.  - Continue using Incentive Spirometer and coughing exercises at least every two (2) hours while awake for one week.  - Continue use of CPAP/BIPAP for diagnosis of sleep apnea as directed.  - No driving or operating machinery allowed while taking narcotic (prescription) pain medication, and until you feel comfortable forcefully applying the brakes if needed. (This usually takes more than 3 days.)    - Make an appointment with your Primary Care Physician within one week post-op to look at your home medications for  possible changes or discontinuity.   Medications  - The nurse will provide a list of medications for you to continue at home   - If you received a Lovenox (Enoxaparin) or Apixiban (Eliquis) prescription at pre-op visit with Surgeon, start taking the medicine the evening of discharge unless directed otherwise.    - If you were prescribed Lovenox (Enoxaparin), review the education/teaching material/video with the nurse.    - Take post op pain meds as prescribed as needed.   - Begin taking Actigall (Ursodiol) one (1) week after surgery if you still have a gallbladder. Contact the office if you do not have the prescription.   - Resume bariatric vitamin one week after surgery.    - If you had a gastric bypass, take omeprazole or other proton pump inhibitor by mouth once daily for four (4) weeks unless you are already taking a proton pump inhibitor as home medication. Follow dosing instructions on package.   Nausea/Vomiting:  The following are possible causes for nausea/vomiting:  - Drinking too much or too fast.  - Sinus drainage/post nasal drip for allergy sufferers (you may take Sudafed, Claritin, Tylenol Sinus/Allergy, or other decongestants and nose sprays to help with this discomfort).  - Low blood sugar (sweating, shaky, irritable, weakness, dizzy or tunnel-vision) - treatment is to sip 100% fruit juice - no sugar added until symptoms subside.  - Acid in fruit juice - (may dilute with water or avoid).  - Eating or drinking something that is not on clear liquid (stage 1) diet.  Any nausea/vomiting that prohibits you from keeping fluids down for greater than 24 hours requires a call to the surgeon's office.  Urine:  Use your urine color as a guide to determine if you are drinking enough fluid. The darker the urine, the more fluids you need to drink. Urine should be clear to light yellow if you are getting enough fluid. If you should experience frequency, burning or pain with urination, blood in urine, contact us or  your primary care physician for possible UTI (urinary tract infection), which could require antibiotics (liquid preferred).  Bowel Movements:  You may not have a bowel movement for 2-5 days after going home. You may then experience liquid, runny or loose stools for approximately 3-4 weeks following surgery. This would require you to drink even more fluids to prevent dehydration. Some patients may experience constipation, which can be treated with increased fluids, drinking warm liquids, increased activity and the use of a Fleets Enema, Milk of Magnesia, or suppositories. The first couple of bowel movements could be bloody, tarry black or dark maroon in color. This is OK as long as the stool returns to a normal color in 1-2 days. If however, you have frequent or a large amount of bloody or tarry black stools and/or become light-headed or dizzy, you may be bleeding and require urgent attention. Please call us right away.  Abdominal Incisions:  You will have small incisions. Do not scrub incisions, but allow the warm, soapy water to run over the incisions, rinse well, and pat dry. You may use any brand of anti-bacterial soap. Do not use Peroxide or Neosporin type ointments on sites, unless instructed to do so by a surgeon or nurse. Monitor daily for signs/symptoms of infection, which might include: drainage with a foul odor, pain, redness, swelling or heat at the incision sight; fever, body aches and chills. If you suspect infection or have a fever, give us a call.  Pain:  You will be given a prescription for pain medication to control your pain. If you feel the dose is too strong, you may take half the ordered dose, or you may take Tylenol adult liquid per package instructions for minor pain. Do not take any medications that contain aspirin or aspirin products.  Do not take medications like: Motrin, Aleve, Ibuprofen, Advil, Naproxen, Celebrex, Daypro, Bextra, Meloxicam or other medications commonly used for arthritis  or joint pain.  No steroids or cortisone injections. There may be pain, which should improve every few days. Pain should not suddenly get worse or more intense. Pain that suddenly changes and is constant and severe should be called in to the surgeon's office. Any sudden pain in the lower extremities with associated warmth and redness should be called in to the surgeon's office immediately. Do not rub or massage this area, as it could be a blood clot.  Diet:    POST OP DIET PROGRESSION FOR SLEEVE AND BYPASS  Stage 1 Clear Liquids - Days 0-1    Gatorade Zero, PowerAde Zero, Sugar free Jell-O, Sugar free popsicles, broth, water, decaf tea, decaf coffee, Crystal light, Makoti, Dasani drops, sugar free Sarthak-Aid, Vitamin Water zero, Propel zero   Stage 2 Full Liquids - Days 2-14   Continue with stage 1 and add:  Skim milk or unsweetened milk alternative such as soymilk, almond milk or oat milk   Chobani less sugar Greek yogurt without chunks, Yoplait light yogurt, Dannon Light & fit original Greek yogurt, Dannon light & fit original or any blended low-fat yogurt with less than 12g of carbs  Low fat cream soups - must be strained, not blended (no chunks)  Sugar free low-fat pudding, no sugar added applesauce   Mashed Potatoes VERY THIN made with skim milk or broth (soup consistency)  Cream of Wheat or Girts - VERY THIN made with skim milk (soup consistency)  Protein powders made with milks listed above or any ready to drink protein shakes that meets calories and protein guidelines   Stage 3 Semi Solid - Days 15-21   Continue with stages 1-2 and add:  Scrambled eggs, low-fat or fat free cottage cheese, low- fat ricotta cheese, fat-free refried beans, oatmeal, canned peaches and pears (packed in water), soft banana, PB2 powdered peanut butter  Protein smoothies: can add fruit to shakes    Stage 4 Soft Foods - Days 22-28   Continue with stages 1 - 3 and add:  Soft cooked vegetables, cooked beans, bananas, canned fruit (fork  mashed) in juice or water, Canned chicken or tuna (fork mashed) canned in water, lean ground turkey or chicken, low-fat cheese   Stage 5 Regular Diet - Days 29+   Continue with stages 1 - 4 and add:  Chicken, turkey, ham, turkey valdez/sausage, lean ground beef, crackers, tortillas, toast, fresh fruit, salad, vegetables, jerky, nuts. Slowly add in small portions of new foods.                                                                                                                                                                 It may take longer to tolerate some foods. You may need to try to avoid the following until 3 months post op: untoasted bread, dry chicken or other meat, pasta, rice, stringy vegetables such as celery or asparagus, fruit with skin     Medications:  The nurse will let you know which medications you will need to continue once you go home. Do not take any medications that are extended or time released if you had the gastric bypass procedure, OK to take if you had the gastric sleeve procedure. Large capsules can be opened and diluted with clear liquids. Check with your physician or pharmacist as to which pills may be crushed and which capsules may be opened and diluted safely. If you still have your gall bladder and were prescribed Actigall (Ursodiol), you may resume this medication one week after your surgery. You will remain on Actigall (Ursodiol) for approximately 6 months. The dose is 1 pill, 2 times each day for 6 months.  Activity:  Continue your deep breathing and coughing exercises with your Incentive Spirometer breathing device at least every 3 hours while awake (10 repetitions each time) for one week. May use CPAP. This will help to prevent respiratory problems such as pneumonia. No lifting, pulling or tugging anything over 25 pounds for 3 weeks after surgery. You may shower but no tub baths, hot tubs or swimming for 2 weeks. Moderate walking is recommended every 3 hours while awake  minimum, increase distance daily. Further exercise will be discussed at the first post-op visit. No driving or operating machinery allow until off narcotic pain medication and until you feel comfortable forcefully applying the brakes (usually takes 3 or more days). For the next few weeks you are at an increased risk for blood clot formation. Therefore you should walk regularly and you should not sit for prolonged periods of time, more than 45 minutes without getting up and walking for 5-10 minutes. This includes car rides. Including riding home from the hospital. If riding a distance greater than 30 miles over the next 2 weeks stop every 30-45 minutes and walk 5-10 mintues each time. No tanning bed use for 8 weeks after surgery and in general, not recommended due to the increased risk for skin cancer. Incisions will burn/blister very badly with tanning bed use.  Illness:  Your primary care physician should treat general illness such as ear infections, sinus infections, and viral type illnesses, etc. Medications prescribed should be liquid/elixir form when possible, for the first 30 days.  General:  In general, it is recommended that you weigh yourself no more than once per week. Let the weight come off you and concentrate on more important things. Remember the weight was not gained overnight, nor will it be lost overnight. Gastric Bypass/ Gastric Sleeve weight loss will continue over a period of 12-18 months. Do not  yourself according to how others are doing after surgery, as this will cause unnecessary discouragement.  THE ABOVE ARE GENERAL GUIDELINES TO ASSIST YOU ONCE HOME, IF YOU ARE IN DOUBT, OR YOU HAVE ANY QUESTIONS, CALL US AT THE NUMBERS LISTED BELOW.  IN THE EVENT OF SUDDEN CHEST PAIN, SHORTNESS OF BREATH, OR ANY LIFE THREATENING CONDITION, CALL 911.  Any time you are evaluated or admitted to another facility, please have someone notify the surgeon's office.  Supplements:  70 grams of protein taken  EVERY DAY. Remember to drink at least 64 ounces of fluid a day, sipping slowly early on. Increase this amount during the summertime. Sipping slowly will not stretch your new stomach. Drinking too fast or gulping liquids will cause brief discomfort and early could cause staple line disruption (leak). With eating, tiny bites, then chew, chew, chew, and swallow. Lay your fork/spoon down for 2-3 minutes, and then take your next bite. Your pouch will tell you within 1-2 bites if it is going to tolerate what you are eating.   Protein Vendors:  Refer to protein vendors' handout from consult class. You can always find protein drinks at the bariatric office, grocery stores, [employer], drug [employer], [employer], health food [employer], and on the Internet. Find one high in protein (15-30 grams per serving) and low carb (less than 18 grams per serving).  Now is a great time to re-read your . Please review specific instructions given to you at discharge by your physician (surgeon).  HOW/WHEN TO CONTACT US:  It is imperative that you contact us with any of the following:    Ÿ fever greater than 101 degrees  Ÿ shortness of breath  Ÿ leg swelling  Ÿ body aches  Ÿ shaking chills  Ÿ nausea and vomitting  Ÿ pain that has worsened  Ÿ redness at incision sites  Ÿ pus or foul smelling drainage from an incision or wound  Ÿ inability to keep fluids down for more than a day  Ÿ any other condition you feel needs our attention.  [facility] - Bariatric: [phone] call this number anytime 24 hours a day / 7 days a week.  Teach-back Questions to be answered by the patient prior to discharge.   What complications would prompt you to call your doctor when you return home? _________________    What is the purpose of your prescribed medication? ________________  What are some potential side effects of the medications you will be taking at home? _______________

## 2025-05-22 NOTE — DISCHARGE INSTR - ACTIVITY
No lifting, pushing, pulling, or tugging >25 pounds for 3 weeks.  - Ambulate every 3 hours while awake minimum for seven (7) days, increase distance daily.

## 2025-05-22 NOTE — PLAN OF CARE
Goal Outcome Evaluation:            C/o intermittent pain, medicated per MAR. Ambulated in the hallway, Tolerated diet, no nausea or vomiting. Plan of care is ongoing.

## 2025-05-22 NOTE — PROGRESS NOTES
"Nutrition Services    Patient Name: Juvenal Herrera  YOB: 2000  MRN: [id number]  Admission date: 5/21/2025    PROGRESS NOTE      Encounter Information: Visited patient this morning - POD 1 VSG. Patient reports no current issues. Patient states he has been drinking better today. Patient has been up walking. Patient with no current questions over diet advance and aware to continue CLLIQ with advance to maintain hydration.        PO Diet: Diet: Liquid; Clear Liquid; Bariatric Stage 1; Fluid Consistency: Thin (IDDSI 0)   PO Supplements:    PO Intake:  Drinking well per patient        Current nutrition support:    Nutrition support review:        Labs (reviewed below):         GI Function:         Nutrition Intervention Updates:        Results from last 7 days   Lab Units 05/21/25  1819   SODIUM mmol/L 138   POTASSIUM mmol/L 5.0   CHLORIDE mmol/L 102   CO2 mmol/L 22.5   BUN mg/dL 13   CREATININE mg/dL 0.68*   CALCIUM mg/dL 9.5   BILIRUBIN mg/dL 0.4   ALK PHOS U/L 108   ALT (SGPT) U/L 61*   AST (SGOT) U/L 35   GLUCOSE mg/dL 139*     Results from last 7 days   Lab Units 05/21/25  1819   MAGNESIUM mg/dL 2.3   PHOSPHORUS mg/dL 3.7   HEMOGLOBIN g/dL 15.1   HEMATOCRIT % 47.0     No results found for: \"COVID19\"  Lab Results   Component Value Date    HGBA1C 5.58 05/09/2025       Wt Readings from Last 10 Encounters:   05/21/25 0600 (!) 183 kg (403 lb 3.2 oz)   05/09/25 1048 (!) 192 kg (423 lb)   04/29/25 1301 (!) 193 kg (424 lb 11.2 oz)   04/11/25 0953 (!) 190 kg (419 lb 1.6 oz)   03/31/25 1225 (!) 196 kg (431 lb)   03/04/25 0822 (!) 192 kg (423 lb 9.6 oz)   02/27/25 1531 (!) 195 kg (430 lb 3.2 oz)   11/27/24 1515 (!) 194 kg (427 lb)   01/05/24 1602 (!) 190 kg (419 lb)   10/05/23 1600 (!) 189 kg (416 lb 12.8 oz)   05/31/23 0919 (!) 189 kg (417 lb)       Patient to follow up in office next week.     RD to follow up per protocol.    Electronically signed by:  Paresh Baugh RD  05/22/25 10:11 EDT    "

## 2025-05-22 NOTE — CASE MANAGEMENT/SOCIAL WORK
Case Management Discharge Note      Final Note: HOME  DISCHARGED PRIOR TO CASE MANAGEMENT SEEING PATIENT         Selected Continued Care - Discharged on 5/22/2025 Admission date: 5/21/2025 - Discharge disposition: Home or Self Care            Transportation Services  Private: Car    Final Discharge Disposition Code: 01 - home or self-care

## 2025-05-28 ENCOUNTER — OFFICE VISIT (OUTPATIENT)
Dept: BARIATRICS/WEIGHT MGMT | Facility: CLINIC | Age: 25
End: 2025-05-28
Payer: COMMERCIAL

## 2025-05-28 VITALS
HEIGHT: 72 IN | DIASTOLIC BLOOD PRESSURE: 88 MMHG | BODY MASS INDEX: 42.66 KG/M2 | OXYGEN SATURATION: 93 % | SYSTOLIC BLOOD PRESSURE: 141 MMHG | HEART RATE: 105 BPM | WEIGHT: 315 LBS

## 2025-05-28 DIAGNOSIS — Z51.89 VISIT FOR WOUND CHECK: ICD-10-CM

## 2025-05-28 DIAGNOSIS — E66.813 CLASS 3 OBESITY: Primary | ICD-10-CM

## 2025-05-28 DIAGNOSIS — Z90.3 S/P GASTRIC SLEEVE PROCEDURE: ICD-10-CM

## 2025-05-28 PROCEDURE — 99024 POSTOP FOLLOW-UP VISIT: CPT | Performed by: NURSE PRACTITIONER

## 2025-05-28 NOTE — PROGRESS NOTES
[facility] BAR SURG [facility] BARIATRIC SURGERY  [address] [place] [address]  [address] [place] [address]  Dept: [phone]  5/28/2025      Juvenal Herrera.  [id number]  [id number]  2000  male      Chief Complaint   Patient presents with    Post-op     1 W GS 5/21/2025     Gastric Sleeve Laparoscopic With Davinci Robot  5/21/2025     Post-Op Bariatric Surgery:     HPI:   Weight loss in the last week:11 pounds    Wt Readings from Last 10 Encounters:   05/28/25 (!) 178 kg (392 lb)   05/21/25 (!) 183 kg (403 lb 3.2 oz)   04/29/25 (!) 193 kg (424 lb 11.2 oz)   04/11/25 (!) 190 kg (419 lb 1.6 oz)   03/31/25 (!) 196 kg (431 lb)   03/04/25 (!) 192 kg (423 lb 9.6 oz)   02/27/25 (!) 195 kg (430 lb 3.2 oz)   11/27/24 (!) 194 kg (427 lb)   01/05/24 (!) 190 kg (419 lb)   10/05/23 (!) 189 kg (416 lb 12.8 oz)     June 9th - Return to work   No nausea or vomiting  Tolerating around 40 oz of fluids a day  Minimal abd pain  Review of Systems   Constitutional:  Positive for activity change and appetite change.   Respiratory: Negative.     Cardiovascular: Negative.    Gastrointestinal: Negative.    Musculoskeletal: Negative.          Past Medical History:   Diagnosis Date    Allergic 2000    Pennicillin and omoxycillan    Obesity      Past Surgical History:   Procedure Laterality Date    ENDOSCOPY N/A 3/31/2025    Procedure: ESOPHAGOGASTRODUODENOSCOPY WITH BIOPSY X1 AREAS;  Surgeon: Cecelia Lassiter MD;  Location: [facility] ENDOSCOPY;  Service: General;  Laterality: N/A;  Post: NORMAL EGD    GASTRIC SLEEVE LAPAROSCOPIC N/A 5/21/2025    Procedure: GASTRIC SLEEVE LAPAROSCOPIC WITH DAVINCI ROBOT;  Surgeon: Cecelia Lassiter MD;  Location: [facility] MAIN OR;  Service: Robotics - DaVinci;  Laterality: N/A;    WISDOM TOOTH EXTRACTION           The following portions of the patient's history were reviewed and updated as appropriate: allergies, current medications, past family  history, past medical history, past social history, past surgical history, and problem list.    Vitals:    05/28/25 1005   BP: 141/88   Pulse: 105   SpO2: 93%       Physical Exam  Constitutional:       Appearance: Normal appearance. He is obese.   Pulmonary:      Effort: Pulmonary effort is normal.   Abdominal:      General: Abdomen is flat.      Palpations: Abdomen is soft.      Comments: Incisions clean, dry and intact    Neurological:      General: No focal deficit present.      Mental Status: He is alert and oriented to person, place, and time.   Psychiatric:         Mood and Affect: Mood normal.         Behavior: Behavior normal.         Thought Content: Thought content normal.         Judgment: Judgment normal.           Assessment:   Patient Active Problem List   Diagnosis    Simple myopia    Refractive amblyopia of right eye    Myopia of both eyes    Vitamin D deficiency    Class 3 obesity    Obesity, Class III, BMI 40-49.9 (morbid obesity)       Post-op, the patient is doing well. He is not having any nausea or vomiting. Minimal abd pain reported. Tolerating po fluids and full liquids well. Diet progression reviewed with pt today and handout given. Plan to follow up with RD at 1 month post op. Ok to return to work on 6/9/25 with a 25 pound lifting  restriction until 6/11/25.     Plan:   Reviewed with patient the importance of following the manual for diet progression. Increase activity as tolerated. Continue increasing daily intake of protein and water.   Return to work: depending on pt's job and job requirements - this was discussed individually with each patient  Activity restrictions: no lifting, pushing or pulling over 25lbs for 3 weeks.   Recommended patient be sure to get at least 70 grams of protein per day. Discussed with the patient the recommended amount of water per day to intake. Reviewed vitamin requirements. Be sure to do routine exercise and increase activity as tolerated. No asa, nsaids or  steroids for 8 weeks. Patient may use miralax as needed if necessary.     Instructions / Recommendations: dietary counseling recommended, recommended a daily protein intake of  grams, vitamin supplement(s) recommended, recommended exercising at least 150 minutes per week, behavior modifications recommended and instructed to call the office for concerns, questions, or problems.     The patient was instructed to follow up at one month follow up appt.     The patient was counseled regarding post op bariatric manual      SAPPHIRE Remy  [facility] bariatrics

## 2025-06-27 ENCOUNTER — OFFICE VISIT (OUTPATIENT)
Dept: BARIATRICS/WEIGHT MGMT | Facility: CLINIC | Age: 25
End: 2025-06-27
Payer: COMMERCIAL

## 2025-06-27 VITALS — BODY MASS INDEX: 44.1 KG/M2 | HEIGHT: 71 IN | WEIGHT: 315 LBS

## 2025-06-27 DIAGNOSIS — E66.9 SUPER OBESE: Primary | ICD-10-CM

## 2025-06-27 PROCEDURE — 99024 POSTOP FOLLOW-UP VISIT: CPT | Performed by: DIETITIAN, REGISTERED

## 2025-06-27 NOTE — PROGRESS NOTES
"Nutrition Services    Patient Name: Juvenal Herrera  YOB: 2000  MRN: [id number]  Date of Service: 06/27/25      ICD-10-CM ICD-9-CM   1. Super obese  E66.9 278.00   2. Body mass index (BMI) of 50-59.9 in adult  Z68.43 V85.43          NUTRITION ASSESSMENT - BARIATRIC SURGERY      Reason for Visit 1 month post op VSG     H&P      Past Medical History:   Diagnosis Date    Allergic 2000    Pennicillin and omoxycillan    Obesity        Past Surgical History:   Procedure Laterality Date    ENDOSCOPY N/A 3/31/2025    Procedure: ESOPHAGOGASTRODUODENOSCOPY WITH BIOPSY X1 AREAS;  Surgeon: Cecelia Lassiter MD;  Location: [facility] ENDOSCOPY;  Service: General;  Laterality: N/A;  Post: NORMAL EGD    GASTRIC SLEEVE LAPAROSCOPIC N/A 5/21/2025    Procedure: GASTRIC SLEEVE LAPAROSCOPIC WITH DAVINCI ROBOT;  Surgeon: Cecelia Lassiter MD;  Location: [facility] MAIN OR;  Service: Robotics - DaVinci;  Laterality: N/A;    WISDOM TOOTH EXTRACTION          Previous Goals          Encounter Information        Visit Narrative     Patient reports no current issues. Patient denies n/v/d. Patient has been adding to diet as tolerated. Patient not yet added in exercise but has been staying active at home.     Diet Recall:   Breakfast: protein bar, applesauce  Lunch: skips  Dinner: protein with side  Snacks: almonds, jerky  Beverages: water - 64oz+/day    Exercise: patient is not currently exercising but is active at work and home    Supplements: bariatric MVI, interested in patches    Self Monitoring:          Anthropometrics        Current Height, Weight Height: 180.3 cm (71\")  Weight: (!) 170 kg (375 lb 12.8 oz) (06/27/25 0928)       Trending Weight Hx  12.8% weight loss in 1 month    Wt Readings from Last 30 Encounters:   06/27/25 0928 (!) 170 kg (375 lb 12.8 oz)   05/28/25 1005 (!) 178 kg (392 lb)   05/21/25 0600 (!) 183 kg (403 lb 3.2 oz)   05/09/25 1048 (!) 192 kg (423 lb)   04/29/25 1301 (!) 193 kg (424 lb 11.2 oz)   04/11/25 0953 (!) " 190 kg (419 lb 1.6 oz)   03/31/25 1225 (!) 196 kg (431 lb)   03/04/25 0822 (!) 192 kg (423 lb 9.6 oz)   02/27/25 1531 (!) 195 kg (430 lb 3.2 oz)   11/27/24 1515 (!) 194 kg (427 lb)   01/05/24 1602 (!) 190 kg (419 lb)   10/05/23 1600 (!) 189 kg (416 lb 12.8 oz)   05/31/23 0919 (!) 189 kg (417 lb)   05/03/23 0935 (!) 187 kg (413 lb)   11/23/22 1156 (!) 190 kg (419 lb)   11/02/22 1107 (!) 189 kg (416 lb)      BMI kg/m2 Body mass index is 52.41 kg/m².       Nutrition Diagnosis         Nutrition Dx Statement Overweight/obesity RT multifactorial biochemical, behavioral and environmental contributors to disease AEB BMI 52.41 kg/m^2         Nutrition Intervention         Nutrition Intervention Nutrition education related to diet modification and physical activity        Monitor/Evaluation        New Goals Patient to continue diet advance   Patient to add in exercise       Total time spent with pt 15 minutes of which 15 minutes were spent on education.       Electronically signed by:  Paresh Baugh RD  06/27/25 10:26 EDT

## (undated) DEVICE — BLADELESS OBTURATOR: Brand: WECK VISTA

## (undated) DEVICE — SYRINGE,TOOMEY,IRRIGATION,70CC,STERILE: Brand: MEDLINE

## (undated) DEVICE — THE STERILE LIGHT HANDLE COVER IS USED WITH STERIS SURGICAL LIGHTING AND VISUALIZATION SYSTEMS.

## (undated) DEVICE — PAD, GROUNDING, UNIVERSAL, SPLIT, 9': Brand: MEDLINE

## (undated) DEVICE — VESSEL SEALER EXTEND: Brand: ENDOWRIST

## (undated) DEVICE — SEAL

## (undated) DEVICE — KT SURG TURNOVER 050

## (undated) DEVICE — STAPLER 60: Brand: SUREFORM

## (undated) DEVICE — COLUMN DRAPE

## (undated) DEVICE — GLV SURG SENSICARE PI ORTHO SZ7.5 LF STRL

## (undated) DEVICE — DECANTER: Brand: UNBRANDED

## (undated) DEVICE — LAPAROVUE VISIBILITY SYSTEM LAPAROSCOPIC SOLUTIONS: Brand: LAPAROVUE

## (undated) DEVICE — ABDOMINAL BINDER: Brand: DEROYAL

## (undated) DEVICE — NEEDLE, QUINCKE, 20GX3.5": Brand: MEDLINE

## (undated) DEVICE — PASS SUT PRO BARIATRIC XL W/TROC SWABS

## (undated) DEVICE — MAT PREVALON MOBL TRANSFR AIR W/PAD REPROC 39X81IN

## (undated) DEVICE — ENDOPATH PNEUMONEEDLE INSUFFLATION NEEDLES WITH LUER LOCK CONNECTORS 120MM: Brand: ENDOPATH

## (undated) DEVICE — VISIGI 3D®  CALIBRATION SYSTEM  SIZE 40FR STD W/ BULB: Brand: BOEHRINGER® VISIGI 3D™ SLEEVE GASTRECTOMY CALIBRATION SYSTEM, SIZE 40FR W/BULB

## (undated) DEVICE — TROC BLADLES AIRSEAL/OPTI THRD 8X120MM 1P/U

## (undated) DEVICE — BITEBLOCK ENDO W/STRAP 60F A/ LF DISP

## (undated) DEVICE — SYR LUERLOK 50ML

## (undated) DEVICE — THE STERILE CAMERA HANDLE COVER IS FOR USE WITH THE STERIS SURGICAL LIGHTING AND VISUALIZATION SYSTEMS.

## (undated) DEVICE — SINGLE-USE BIOPSY FORCEPS: Brand: RADIAL JAW 4

## (undated) DEVICE — GOWN,REINF,POLY,ECL,PP SLV,XL,XLONG: Brand: MEDLINE

## (undated) DEVICE — GLOVE,SURG,SENSICARE SLT,LF,PF,7.5: Brand: MEDLINE

## (undated) DEVICE — REDUCER: Brand: ENDOWRIST

## (undated) DEVICE — PK BARIATRIC 50

## (undated) DEVICE — ARM DRAPE

## (undated) DEVICE — ST TBG AIRSEAL BIF FLTR W/ACT/CHARCOAL/FLTR

## (undated) DEVICE — PK ENDO GI 50